# Patient Record
Sex: FEMALE | Race: WHITE | Employment: FULL TIME | URBAN - METROPOLITAN AREA
[De-identification: names, ages, dates, MRNs, and addresses within clinical notes are randomized per-mention and may not be internally consistent; named-entity substitution may affect disease eponyms.]

---

## 2017-02-15 ENCOUNTER — OFFICE VISIT (OUTPATIENT)
Dept: FAMILY MEDICINE CLINIC | Facility: CLINIC | Age: 47
End: 2017-02-15

## 2017-02-15 VITALS
HEIGHT: 63 IN | OXYGEN SATURATION: 96 % | WEIGHT: 163.5 LBS | RESPIRATION RATE: 16 BRPM | SYSTOLIC BLOOD PRESSURE: 116 MMHG | BODY MASS INDEX: 28.97 KG/M2 | HEART RATE: 69 BPM | DIASTOLIC BLOOD PRESSURE: 80 MMHG | TEMPERATURE: 98 F

## 2017-02-15 DIAGNOSIS — Z00.00 ROUTINE HISTORY AND PHYSICAL EXAMINATION OF ADULT: Primary | ICD-10-CM

## 2017-02-15 DIAGNOSIS — Z13.0 SCREENING, ANEMIA, DEFICIENCY, IRON: ICD-10-CM

## 2017-02-15 DIAGNOSIS — R92.2 DENSE BREAST TISSUE ON MAMMOGRAM: ICD-10-CM

## 2017-02-15 DIAGNOSIS — Z12.4 CERVICAL CANCER SCREENING: ICD-10-CM

## 2017-02-15 DIAGNOSIS — Z13.29 THYROID DISORDER SCREEN: ICD-10-CM

## 2017-02-15 DIAGNOSIS — E55.9 VITAMIN D DEFICIENCY: ICD-10-CM

## 2017-02-15 DIAGNOSIS — E04.1 THYROID NODULE: ICD-10-CM

## 2017-02-15 DIAGNOSIS — Z13.21 ENCOUNTER FOR VITAMIN DEFICIENCY SCREENING: ICD-10-CM

## 2017-02-15 DIAGNOSIS — Z13.1 DIABETES MELLITUS SCREENING: ICD-10-CM

## 2017-02-15 DIAGNOSIS — Z13.220 LIPID SCREENING: ICD-10-CM

## 2017-02-15 PROBLEM — R92.30 DENSE BREAST TISSUE ON MAMMOGRAM: Status: ACTIVE | Noted: 2017-02-15

## 2017-02-15 LAB
BASOPHILS # BLD AUTO: 0.05 X10(3) UL (ref 0–0.1)
BASOPHILS NFR BLD AUTO: 0.6 %
EOSINOPHIL # BLD AUTO: 0.09 X10(3) UL (ref 0–0.3)
EOSINOPHIL NFR BLD AUTO: 1.1 %
ERYTHROCYTE [DISTWIDTH] IN BLOOD BY AUTOMATED COUNT: 13.3 % (ref 11.5–16)
HCT VFR BLD AUTO: 40.7 % (ref 34–50)
HGB BLD-MCNC: 13.1 G/DL (ref 12–16)
IMMATURE GRANULOCYTE COUNT: 0.01 X10(3) UL (ref 0–1)
IMMATURE GRANULOCYTE RATIO %: 0.1 %
LYMPHOCYTES # BLD AUTO: 2.45 X10(3) UL (ref 0.9–4)
LYMPHOCYTES NFR BLD AUTO: 29.6 %
MCH RBC QN AUTO: 28.9 PG (ref 27–33.2)
MCHC RBC AUTO-ENTMCNC: 32.2 G/DL (ref 31–37)
MCV RBC AUTO: 89.6 FL (ref 81–100)
MONOCYTES # BLD AUTO: 0.57 X10(3) UL (ref 0.1–0.6)
MONOCYTES NFR BLD AUTO: 6.9 %
NEUTROPHIL ABS PRELIM: 5.12 X10 (3) UL (ref 1.3–6.7)
NEUTROPHILS # BLD AUTO: 5.12 X10(3) UL (ref 1.3–6.7)
NEUTROPHILS NFR BLD AUTO: 61.7 %
PLATELET # BLD AUTO: 318 10(3)UL (ref 150–450)
RBC # BLD AUTO: 4.54 X10(6)UL (ref 3.8–5.1)
RED CELL DISTRIBUTION WIDTH-SD: 43.7 FL (ref 35.1–46.3)
WBC # BLD AUTO: 8.3 X10(3) UL (ref 4–13)

## 2017-02-15 PROCEDURE — 83036 HEMOGLOBIN GLYCOSYLATED A1C: CPT | Performed by: FAMILY MEDICINE

## 2017-02-15 PROCEDURE — 84439 ASSAY OF FREE THYROXINE: CPT | Performed by: FAMILY MEDICINE

## 2017-02-15 PROCEDURE — 80050 GENERAL HEALTH PANEL: CPT | Performed by: FAMILY MEDICINE

## 2017-02-15 PROCEDURE — 88175 CYTOPATH C/V AUTO FLUID REDO: CPT | Performed by: FAMILY MEDICINE

## 2017-02-15 PROCEDURE — 80061 LIPID PANEL: CPT | Performed by: FAMILY MEDICINE

## 2017-02-15 PROCEDURE — 99396 PREV VISIT EST AGE 40-64: CPT | Performed by: FAMILY MEDICINE

## 2017-02-15 PROCEDURE — 80053 COMPREHEN METABOLIC PANEL: CPT | Performed by: FAMILY MEDICINE

## 2017-02-15 PROCEDURE — 84443 ASSAY THYROID STIM HORMONE: CPT | Performed by: FAMILY MEDICINE

## 2017-02-15 PROCEDURE — 82306 VITAMIN D 25 HYDROXY: CPT | Performed by: FAMILY MEDICINE

## 2017-02-15 PROCEDURE — 85025 COMPLETE CBC W/AUTO DIFF WBC: CPT | Performed by: FAMILY MEDICINE

## 2017-02-15 PROCEDURE — 87624 HPV HI-RISK TYP POOLED RSLT: CPT | Performed by: FAMILY MEDICINE

## 2017-02-15 PROCEDURE — 36415 COLL VENOUS BLD VENIPUNCTURE: CPT | Performed by: FAMILY MEDICINE

## 2017-02-15 RX ORDER — ACETAMINOPHEN 160 MG
TABLET,DISINTEGRATING ORAL DAILY
COMMUNITY

## 2017-02-15 RX ORDER — MULTIVITAMIN WITH IRON
250 TABLET ORAL
COMMUNITY

## 2017-02-15 RX ORDER — MELATONIN 10 MG
1 CAPSULE ORAL NIGHTLY PRN
COMMUNITY

## 2017-02-15 NOTE — PROGRESS NOTES
HPI:   Maik Carranza is a 42year old female who presents for a complete physical exam.  Patient complains of nothing major, has been healtlhy. She continues to see Dr. Giovanna Díaz for anxiety and that is going well.   She was also sent for neurop Smokeless Status: Never Used                        Comment: Non-smoker    Alcohol Use: Yes                Comment: 2 per week    Occ: dental hygenist. : yes. Children: yes.    Expretty healthyercise: 6days/week  Diet: pretty healthy     REVIEW OF SY are intact,motor and sensory are grossly intact    ASSESSMENT AND PLAN:   Generally well appearing female with normal exam and healthy lifestyle, keep it up  Check routine fasting labs as ordered  PAP sent with HR HPV, if neg repeat in 3 years  STD testing

## 2017-02-16 LAB
25-HYDROXYVITAMIN D (TOTAL): 43.6 NG/ML (ref 30–100)
ALBUMIN SERPL-MCNC: 4 G/DL (ref 3.5–4.8)
ALP LIVER SERPL-CCNC: 85 U/L (ref 37–98)
ALT SERPL-CCNC: 31 U/L (ref 14–54)
AST SERPL-CCNC: 17 U/L (ref 15–41)
BILIRUB SERPL-MCNC: 0.4 MG/DL (ref 0.1–2)
BUN BLD-MCNC: 11 MG/DL (ref 8–20)
CALCIUM BLD-MCNC: 8.4 MG/DL (ref 8.3–10.3)
CHLORIDE: 103 MMOL/L (ref 101–111)
CHOLEST SMN-MCNC: 214 MG/DL (ref ?–200)
CO2: 31 MMOL/L (ref 22–32)
CREAT BLD-MCNC: 1.03 MG/DL (ref 0.55–1.02)
EST. AVERAGE GLUCOSE BLD GHB EST-MCNC: 114 MG/DL (ref 68–126)
FREE T4: 1 NG/DL (ref 0.9–1.8)
GLUCOSE BLD-MCNC: 78 MG/DL (ref 70–99)
HBA1C MFR BLD HPLC: 5.6 % (ref ?–5.7)
HDLC SERPL-MCNC: 70 MG/DL (ref 45–?)
HDLC SERPL: 3.06 {RATIO} (ref ?–4.44)
LDLC SERPL CALC-MCNC: 123 MG/DL (ref ?–130)
M PROTEIN MFR SERPL ELPH: 7.2 G/DL (ref 6.1–8.3)
NONHDLC SERPL-MCNC: 144 MG/DL (ref ?–130)
POTASSIUM SERPL-SCNC: 3.4 MMOL/L (ref 3.6–5.1)
SODIUM SERPL-SCNC: 142 MMOL/L (ref 136–144)
TRIGLYCERIDES: 106 MG/DL (ref ?–150)
TSI SER-ACNC: 1.27 MIU/ML (ref 0.35–5.5)
VLDL: 21 MG/DL (ref 5–40)

## 2017-02-17 LAB — HPV I/H RISK 1 DNA SPEC QL NAA+PROBE: NEGATIVE

## 2017-02-20 LAB
LAST PAP RESULT: NORMAL
PAP HISTORY (OTHER THAN LAST PAP): NORMAL

## 2017-08-10 ENCOUNTER — TELEPHONE (OUTPATIENT)
Dept: FAMILY MEDICINE CLINIC | Facility: CLINIC | Age: 47
End: 2017-08-10

## 2017-08-10 DIAGNOSIS — E04.1 THYROID NODULE: Primary | ICD-10-CM

## 2017-08-10 DIAGNOSIS — R92.2 DENSE BREAST TISSUE ON MAMMOGRAM: ICD-10-CM

## 2017-08-10 DIAGNOSIS — Z12.31 ENCOUNTER FOR SCREENING MAMMOGRAM FOR BREAST CANCER: ICD-10-CM

## 2017-08-10 NOTE — TELEPHONE ENCOUNTER
PT STOPPED AND WOULD LIKE DR TO ORDER MAMMO AND THYROID ULTRASOUND. PT HAD THIS DONE LAST YEAR.     PLEASE CALL PT IF ANY QUESTIONS OR PROBLEMS [phone]      THANK YOU

## 2017-08-10 NOTE — TELEPHONE ENCOUNTER
Routing to Dr. Tutu Tamayo. Last mammo and thyroid US done on 9/1/16. Also has US G FNA thyroid nodule on 9/15/16 with benign findings. Please advise. Thank you.

## 2017-09-21 ENCOUNTER — HOSPITAL ENCOUNTER (OUTPATIENT)
Dept: ULTRASOUND IMAGING | Age: 47
Discharge: HOME | End: 2017-09-21
Attending: FAMILY MEDICINE
Payer: COMMERCIAL

## 2017-09-21 ENCOUNTER — HOSPITAL ENCOUNTER (OUTPATIENT)
Dept: MAMMOGRAPHY | Age: 47
Discharge: HOME | End: 2017-09-21
Attending: FAMILY MEDICINE
Payer: COMMERCIAL

## 2017-09-21 DIAGNOSIS — Z12.31 ENCOUNTER FOR SCREENING MAMMOGRAM FOR BREAST CANCER: ICD-10-CM

## 2017-09-21 DIAGNOSIS — E04.1 THYROID NODULE: ICD-10-CM

## 2017-09-21 DIAGNOSIS — R92.2 DENSE BREAST TISSUE ON MAMMOGRAM: ICD-10-CM

## 2017-09-21 PROCEDURE — 77063 BREAST TOMOSYNTHESIS BI: CPT | Performed by: FAMILY MEDICINE

## 2017-09-21 PROCEDURE — 77067 SCR MAMMO BI INCL CAD: CPT | Performed by: FAMILY MEDICINE

## 2017-09-21 PROCEDURE — 76536 US EXAM OF HEAD AND NECK: CPT | Performed by: FAMILY MEDICINE

## 2018-04-23 ENCOUNTER — PATIENT MESSAGE (OUTPATIENT)
Dept: FAMILY MEDICINE CLINIC | Facility: CLINIC | Age: 48
End: 2018-04-23

## 2018-04-23 DIAGNOSIS — Z13.0 SCREENING FOR DEFICIENCY ANEMIA: ICD-10-CM

## 2018-04-23 DIAGNOSIS — Z13.220 LIPID SCREENING: ICD-10-CM

## 2018-04-23 DIAGNOSIS — Z00.00 ROUTINE GENERAL MEDICAL EXAMINATION AT A HEALTH CARE FACILITY: Primary | ICD-10-CM

## 2018-04-23 DIAGNOSIS — E55.9 VITAMIN D DEFICIENCY: ICD-10-CM

## 2018-04-23 DIAGNOSIS — E04.1 THYROID NODULE: ICD-10-CM

## 2018-04-23 DIAGNOSIS — Z13.1 DIABETES MELLITUS SCREENING: ICD-10-CM

## 2018-04-24 NOTE — TELEPHONE ENCOUNTER
From: Ernie Welch  To: Adalberto Gilliam MD  Sent: 4/23/2018 9:17 PM CDT  Subject: Prescription Question    Hi there, hope you're doing great.  I saw my psychiatrist today for routine visit (Dr Piedad Talbert) & mentioned to her that I will notice mys

## 2018-05-29 ENCOUNTER — TELEPHONE (OUTPATIENT)
Dept: FAMILY MEDICINE CLINIC | Facility: CLINIC | Age: 48
End: 2018-05-29

## 2018-06-02 ENCOUNTER — OFFICE VISIT (OUTPATIENT)
Dept: FAMILY MEDICINE CLINIC | Facility: CLINIC | Age: 48
End: 2018-06-02

## 2018-06-02 VITALS
TEMPERATURE: 98 F | OXYGEN SATURATION: 99 % | DIASTOLIC BLOOD PRESSURE: 78 MMHG | BODY MASS INDEX: 26.61 KG/M2 | SYSTOLIC BLOOD PRESSURE: 118 MMHG | RESPIRATION RATE: 16 BRPM | WEIGHT: 150.19 LBS | HEART RATE: 65 BPM | HEIGHT: 63 IN

## 2018-06-02 DIAGNOSIS — H60.391 OTHER INFECTIVE ACUTE OTITIS EXTERNA OF RIGHT EAR: Primary | ICD-10-CM

## 2018-06-02 PROCEDURE — 99214 OFFICE O/P EST MOD 30 MIN: CPT | Performed by: FAMILY MEDICINE

## 2018-06-02 RX ORDER — CIPROFLOXACIN AND DEXAMETHASONE 3; 1 MG/ML; MG/ML
4 SUSPENSION/ DROPS AURICULAR (OTIC) 2 TIMES DAILY
Qty: 1 BOTTLE | Refills: 0 | Status: SHIPPED | OUTPATIENT
Start: 2018-06-02 | End: 2018-06-09

## 2018-06-02 NOTE — PROGRESS NOTES
Vladimir Alexandre is a 57year old female. Patient presents with:  Ear Problem: per pt. Ear tender to touch, painful, inflammed, for 2 days      HPI:   Has had bad ears her whole life. Had permanent tubes placed at age 5.    Now pain in her right ear, jaw oz  02/15/17 : 163 lb 8 oz  10/20/15 : 159 lb  03/13/15 : 156 lb 8 oz  03/11/15 : 155 lb  03/21/13 : 152 lb 6 oz      REVIEW OF SYSTEMS:   GENERAL HEALTH: feels well no complaints  SKIN: denies any unusual skin lesions or rashes  RESPIRATORY: denies shortn

## 2018-06-05 ENCOUNTER — TELEPHONE (OUTPATIENT)
Dept: FAMILY MEDICINE CLINIC | Facility: CLINIC | Age: 48
End: 2018-06-05

## 2018-06-05 RX ORDER — CEFDINIR 300 MG/1
300 CAPSULE ORAL 2 TIMES DAILY
Qty: 20 CAPSULE | Refills: 0 | Status: SHIPPED | OUTPATIENT
Start: 2018-06-05 | End: 2018-06-15

## 2018-06-05 NOTE — TELEPHONE ENCOUNTER
Spoke with the pt and I asked her about the reaction to the ampicillin and she states that it was diarrhea    I advised that we called in cefdinir for her- she v/u  She states that she has an appt with her ENT on Thursday  Advised to call if something come

## 2018-06-05 NOTE — TELEPHONE ENCOUNTER
I'd like to put her on an oral abx. I would like her to take cefdinir. Has she tolerated that before? There is a very small risk of cross reaction when you have a penicillin allergy. What was her rxn to ampicillin.  If it was anaphylaxis, I would send a zpa

## 2018-06-05 NOTE — TELEPHONE ENCOUNTER
PT CALLED AND ADV THAT SHE IS NOT FEELING ANY BETTER AND WOULD LIKE TO SEE IF DR CAN CALL IN SOMETHING.   PT WAS SEEN ON Saturday W/DR AMES AND WAS GIVEN DROPS FOR EAR INFECTION. 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

## 2018-06-14 ENCOUNTER — MED REC SCAN ONLY (OUTPATIENT)
Dept: FAMILY MEDICINE CLINIC | Facility: CLINIC | Age: 48
End: 2018-06-14

## 2018-06-16 ENCOUNTER — NURSE ONLY (OUTPATIENT)
Dept: FAMILY MEDICINE CLINIC | Facility: CLINIC | Age: 48
End: 2018-06-16

## 2018-06-16 DIAGNOSIS — Z00.00 ROUTINE GENERAL MEDICAL EXAMINATION AT A HEALTH CARE FACILITY: ICD-10-CM

## 2018-06-16 DIAGNOSIS — Z13.220 LIPID SCREENING: ICD-10-CM

## 2018-06-16 DIAGNOSIS — Z13.1 DIABETES MELLITUS SCREENING: ICD-10-CM

## 2018-06-16 DIAGNOSIS — E55.9 VITAMIN D DEFICIENCY: ICD-10-CM

## 2018-06-16 DIAGNOSIS — E04.1 THYROID NODULE: ICD-10-CM

## 2018-06-16 DIAGNOSIS — Z13.0 SCREENING FOR DEFICIENCY ANEMIA: ICD-10-CM

## 2018-06-16 PROCEDURE — 82306 VITAMIN D 25 HYDROXY: CPT | Performed by: FAMILY MEDICINE

## 2018-06-16 PROCEDURE — 36415 COLL VENOUS BLD VENIPUNCTURE: CPT | Performed by: FAMILY MEDICINE

## 2018-06-16 PROCEDURE — 80050 GENERAL HEALTH PANEL: CPT | Performed by: FAMILY MEDICINE

## 2018-06-16 PROCEDURE — 80061 LIPID PANEL: CPT | Performed by: FAMILY MEDICINE

## 2018-06-16 NOTE — PROGRESS NOTES
1 gold, 1 mint and 1 lavender tube collected from L AC using straight needle and 1 attempt    Pt tolerated and was sent home in stable condition

## 2018-06-21 ENCOUNTER — OFFICE VISIT (OUTPATIENT)
Dept: FAMILY MEDICINE CLINIC | Facility: CLINIC | Age: 48
End: 2018-06-21

## 2018-06-21 VITALS
TEMPERATURE: 98 F | RESPIRATION RATE: 16 BRPM | HEART RATE: 64 BPM | SYSTOLIC BLOOD PRESSURE: 116 MMHG | HEIGHT: 63 IN | WEIGHT: 149 LBS | DIASTOLIC BLOOD PRESSURE: 70 MMHG | BODY MASS INDEX: 26.4 KG/M2

## 2018-06-21 DIAGNOSIS — Z12.31 ENCOUNTER FOR SCREENING MAMMOGRAM FOR BREAST CANCER: ICD-10-CM

## 2018-06-21 DIAGNOSIS — R22.0 CHEEK SWELLING: ICD-10-CM

## 2018-06-21 DIAGNOSIS — D50.9 MICROCYTIC ANEMIA: ICD-10-CM

## 2018-06-21 DIAGNOSIS — Z00.00 ROUTINE HISTORY AND PHYSICAL EXAMINATION OF ADULT: Primary | ICD-10-CM

## 2018-06-21 DIAGNOSIS — N94.3 PMS (PREMENSTRUAL SYNDROME): ICD-10-CM

## 2018-06-21 DIAGNOSIS — E55.9 VITAMIN D DEFICIENCY: ICD-10-CM

## 2018-06-21 DIAGNOSIS — R92.2 DENSE BREASTS: ICD-10-CM

## 2018-06-21 DIAGNOSIS — Z12.39 SCREENING BREAST EXAMINATION: ICD-10-CM

## 2018-06-21 DIAGNOSIS — R79.89 ELEVATED SERUM CREATININE: ICD-10-CM

## 2018-06-21 DIAGNOSIS — E04.1 THYROID NODULE: ICD-10-CM

## 2018-06-21 PROCEDURE — 99396 PREV VISIT EST AGE 40-64: CPT | Performed by: FAMILY MEDICINE

## 2018-06-21 RX ORDER — NORETHINDRONE ACETATE AND ETHINYL ESTRADIOL 1MG-20(21)
1 KIT ORAL DAILY
Qty: 28 TABLET | Refills: 11 | Status: SHIPPED | OUTPATIENT
Start: 2018-06-21 | End: 2019-10-02

## 2018-06-21 NOTE — PROGRESS NOTES
HPI:   Maik Carranza is a 57year old female who presents for a complete physical exam.  Patient complains of periods being regular but notices pattern of 7-10 days before period feeling uneven, emotional, irritable, and every time she looks at her tr 150 MG Oral Tablet 24 Hr Take 1 tablet by mouth daily. Disp:  Rfl: 0   BusPIRone HCl (BUSPAR) 15 MG Oral Tab Take 15 mg by mouth 2 (two) times daily.    Disp:  Rfl: 0      Past Medical History:   Diagnosis Date   • Family history of malignant neoplasm of br months  MUSCULOSKELETAL: denies back pain, no joint pains or swelling  NEURO: denies headaches, no syncope or near syncope  PSYCHE: well controlled depression or anxiety apart from pre-menstrual time  HEMATOLOGIC: no bruising or noted lymph nodes  ENDOCRIN now (go back to 10,000 oct-pril)   Mild Cr elevation likely d/t dehdyration, can recheck labs in 1 year with px  Recheck thyroid u/s in 2019  PAP UTD  STD testing not indicated  Immunizations UTD  Mammogram gave order for the fall  Colonoscopy not indicate

## 2018-09-26 ENCOUNTER — TELEPHONE (OUTPATIENT)
Dept: FAMILY MEDICINE CLINIC | Facility: CLINIC | Age: 48
End: 2018-09-26

## 2018-09-26 ENCOUNTER — HOSPITAL ENCOUNTER (OUTPATIENT)
Dept: MAMMOGRAPHY | Age: 48
Discharge: HOME | End: 2018-09-26
Attending: FAMILY MEDICINE
Payer: COMMERCIAL

## 2018-09-26 DIAGNOSIS — Z12.31 ENCOUNTER FOR SCREENING MAMMOGRAM FOR BREAST CANCER: ICD-10-CM

## 2018-09-26 DIAGNOSIS — R92.2 DENSE BREASTS: ICD-10-CM

## 2018-09-26 PROCEDURE — 77063 BREAST TOMOSYNTHESIS BI: CPT | Performed by: FAMILY MEDICINE

## 2018-09-26 PROCEDURE — 77067 SCR MAMMO BI INCL CAD: CPT | Performed by: FAMILY MEDICINE

## 2018-09-26 NOTE — TELEPHONE ENCOUNTER
PT CALLED AND ADV THAT SHE THINKS THAT SHE HAS A TORN CALF MUSCLE.     WOULD LIKE TO KNOW IF SHE NEEDS TO BE SEEN BY DR Antione Roque OR CAN SHE GO TO ORTHOPEDIC DR?    PLEASE CALL AND ADV

## 2018-09-26 NOTE — TELEPHONE ENCOUNTER
Colette, agree likely muscle and/or tendon injury, ice, wrap and see ortho.   Dr. Marta Lange [phone]

## 2018-09-26 NOTE — TELEPHONE ENCOUNTER
A few weeks ago reached up cleaning a mirror. Something felt like it popped on right achilles. Calf muscle felt tight. Made modifications to workout so that she wouldn't aggravate it.   Was at the gym today, went to push up and felt immense pain right i

## 2018-09-26 NOTE — TELEPHONE ENCOUNTER
Patient advised of below. Also advised to confirm with insurance that Dr. Du Martinez would be covered. Agrees to plan. No further questions at this time.

## 2018-10-04 ENCOUNTER — HOSPITAL ENCOUNTER (OUTPATIENT)
Dept: MAMMOGRAPHY | Age: 48
Discharge: HOME | End: 2018-10-04
Attending: FAMILY MEDICINE
Payer: COMMERCIAL

## 2018-10-04 ENCOUNTER — HOSPITAL ENCOUNTER (OUTPATIENT)
Dept: ULTRASOUND IMAGING | Age: 48
Discharge: HOME | End: 2018-10-04
Attending: FAMILY MEDICINE
Payer: COMMERCIAL

## 2018-10-04 DIAGNOSIS — R92.2 INCONCLUSIVE MAMMOGRAM: ICD-10-CM

## 2018-10-04 PROCEDURE — 76642 ULTRASOUND BREAST LIMITED: CPT | Performed by: FAMILY MEDICINE

## 2018-10-04 PROCEDURE — 77066 DX MAMMO INCL CAD BI: CPT | Performed by: FAMILY MEDICINE

## 2018-10-04 PROCEDURE — 77062 BREAST TOMOSYNTHESIS BI: CPT | Performed by: FAMILY MEDICINE

## 2019-09-23 ENCOUNTER — TELEPHONE (OUTPATIENT)
Dept: FAMILY MEDICINE CLINIC | Facility: CLINIC | Age: 49
End: 2019-09-23

## 2019-09-23 ENCOUNTER — PATIENT MESSAGE (OUTPATIENT)
Dept: FAMILY MEDICINE CLINIC | Facility: CLINIC | Age: 49
End: 2019-09-23

## 2019-09-23 DIAGNOSIS — E04.1 THYROID NODULE: ICD-10-CM

## 2019-09-23 DIAGNOSIS — Z00.00 ROUTINE GENERAL MEDICAL EXAMINATION AT A HEALTH CARE FACILITY: ICD-10-CM

## 2019-09-23 DIAGNOSIS — Z12.31 SCREENING MAMMOGRAM, ENCOUNTER FOR: Primary | ICD-10-CM

## 2019-09-23 NOTE — TELEPHONE ENCOUNTER
She is due for the ultrasound,  Last mammo was 10/4/18    Future Appointments   Date Time Provider [place]   10/2/2019  4:00 PM Martha Cortez MD [facility] EMG Sonia Short

## 2019-09-23 NOTE — TELEPHONE ENCOUNTER
From: Arthur Baeza  To: Maile Owens MD  Sent: 9/23/2019 5:59 PM CDT  Subject: Other    Hi there. Just wondering if Dr Foreman Deaana can order my blood work, so that I can have it done & she will have the results at my physical appt on October 3rd.    Also-

## 2019-09-23 NOTE — TELEPHONE ENCOUNTER
Patient has an appointment next Wednesday with Dr Ameya Sage for her Annual Px. Wants to get blood work done before so that they can go over results at her appt next week. Will Dr Ameya Sage put orders in for her yearly labs, her annual mammogram & her Thyroid US?

## 2019-09-24 NOTE — TELEPHONE ENCOUNTER
Called the pt and advised of the orders and we scheduled her NV  Future Appointments   Date Time Provider [place]   9/26/2019  8:45 AM  [facility] EMG Aldair Quiroz   10/2/2019  4:00 PM Michael Gifford MD [facility] EMG Aldair Quiroz

## 2019-09-26 ENCOUNTER — NURSE ONLY (OUTPATIENT)
Dept: FAMILY MEDICINE CLINIC | Facility: CLINIC | Age: 49
End: 2019-09-26
Payer: COMMERCIAL

## 2019-09-26 DIAGNOSIS — Z00.00 ROUTINE GENERAL MEDICAL EXAMINATION AT A HEALTH CARE FACILITY: ICD-10-CM

## 2019-09-26 DIAGNOSIS — R73.9 ELEVATED BLOOD SUGAR: Primary | ICD-10-CM

## 2019-09-26 DIAGNOSIS — E04.1 THYROID NODULE: ICD-10-CM

## 2019-09-26 LAB
ALBUMIN SERPL-MCNC: 4.2 G/DL (ref 3.4–5)
ALBUMIN/GLOB SERPL: 1.4 {RATIO} (ref 1–2)
ALP LIVER SERPL-CCNC: 64 U/L (ref 39–100)
ALT SERPL-CCNC: 19 U/L (ref 13–56)
ANION GAP SERPL CALC-SCNC: 2 MMOL/L (ref 0–18)
AST SERPL-CCNC: 11 U/L (ref 15–37)
BASOPHILS # BLD AUTO: 0.05 X10(3) UL (ref 0–0.2)
BASOPHILS NFR BLD AUTO: 0.7 %
BILIRUB SERPL-MCNC: 0.4 MG/DL (ref 0.1–2)
BUN BLD-MCNC: 12 MG/DL (ref 7–18)
BUN/CREAT SERPL: 11.1 (ref 10–20)
CALCIUM BLD-MCNC: 8.9 MG/DL (ref 8.5–10.1)
CHLORIDE SERPL-SCNC: 107 MMOL/L (ref 98–112)
CHOLEST SMN-MCNC: 249 MG/DL (ref ?–200)
CO2 SERPL-SCNC: 30 MMOL/L (ref 21–32)
CREAT BLD-MCNC: 1.08 MG/DL (ref 0.55–1.02)
DEPRECATED RDW RBC AUTO: 42.3 FL (ref 35.1–46.3)
EOSINOPHIL # BLD AUTO: 0.07 X10(3) UL (ref 0–0.7)
EOSINOPHIL NFR BLD AUTO: 1 %
ERYTHROCYTE [DISTWIDTH] IN BLOOD BY AUTOMATED COUNT: 13.2 % (ref 11–15)
GLOBULIN PLAS-MCNC: 2.9 G/DL (ref 2.8–4.4)
GLUCOSE BLD-MCNC: 108 MG/DL (ref 70–99)
HCT VFR BLD AUTO: 41.3 % (ref 35–48)
HDLC SERPL-MCNC: 55 MG/DL (ref 40–59)
HGB BLD-MCNC: 13.3 G/DL (ref 12–16)
IMM GRANULOCYTES # BLD AUTO: 0.02 X10(3) UL (ref 0–1)
IMM GRANULOCYTES NFR BLD: 0.3 %
LDLC SERPL CALC-MCNC: 174 MG/DL (ref ?–100)
LYMPHOCYTES # BLD AUTO: 1.73 X10(3) UL (ref 1–4)
LYMPHOCYTES NFR BLD AUTO: 24.3 %
M PROTEIN MFR SERPL ELPH: 7.1 G/DL (ref 6.4–8.2)
MCH RBC QN AUTO: 28.1 PG (ref 26–34)
MCHC RBC AUTO-ENTMCNC: 32.2 G/DL (ref 31–37)
MCV RBC AUTO: 87.3 FL (ref 80–100)
MONOCYTES # BLD AUTO: 0.46 X10(3) UL (ref 0.1–1)
MONOCYTES NFR BLD AUTO: 6.5 %
NEUTROPHILS # BLD AUTO: 4.79 X10 (3) UL (ref 1.5–7.7)
NEUTROPHILS # BLD AUTO: 4.79 X10(3) UL (ref 1.5–7.7)
NEUTROPHILS NFR BLD AUTO: 67.2 %
NONHDLC SERPL-MCNC: 194 MG/DL (ref ?–130)
OSMOLALITY SERPL CALC.SUM OF ELEC: 288 MOSM/KG (ref 275–295)
PLATELET # BLD AUTO: 252 10(3)UL (ref 150–450)
POTASSIUM SERPL-SCNC: 4.5 MMOL/L (ref 3.5–5.1)
RBC # BLD AUTO: 4.73 X10(6)UL (ref 3.8–5.3)
SODIUM SERPL-SCNC: 139 MMOL/L (ref 136–145)
TRIGL SERPL-MCNC: 98 MG/DL (ref 30–149)
TSI SER-ACNC: 1.52 MIU/ML (ref 0.36–3.74)
VIT D+METAB SERPL-MCNC: 48.9 NG/ML (ref 30–100)
VLDLC SERPL CALC-MCNC: 20 MG/DL (ref 0–30)
WBC # BLD AUTO: 7.1 X10(3) UL (ref 4–11)

## 2019-09-26 PROCEDURE — 83036 HEMOGLOBIN GLYCOSYLATED A1C: CPT | Performed by: FAMILY MEDICINE

## 2019-09-26 PROCEDURE — 80050 GENERAL HEALTH PANEL: CPT | Performed by: FAMILY MEDICINE

## 2019-09-26 PROCEDURE — 80061 LIPID PANEL: CPT | Performed by: FAMILY MEDICINE

## 2019-09-26 PROCEDURE — 82306 VITAMIN D 25 HYDROXY: CPT | Performed by: FAMILY MEDICINE

## 2019-09-26 PROCEDURE — 36415 COLL VENOUS BLD VENIPUNCTURE: CPT | Performed by: FAMILY MEDICINE

## 2019-09-26 NOTE — PROGRESS NOTES
Pt was in office for NV for labs per [facility]    1 gold, 1 mint and 1 lavender tube collected from L AC using straight needle and 1 attempt    Pt tolerated and was sent home in stable condition

## 2019-09-29 LAB
EST. AVERAGE GLUCOSE BLD GHB EST-MCNC: 114 MG/DL (ref 68–126)
HBA1C MFR BLD HPLC: 5.6 % (ref ?–5.7)

## 2019-10-02 ENCOUNTER — HOSPITAL ENCOUNTER (OUTPATIENT)
Dept: MAMMOGRAPHY | Age: 49
Discharge: HOME | End: 2019-10-02
Attending: FAMILY MEDICINE
Payer: COMMERCIAL

## 2019-10-02 ENCOUNTER — OFFICE VISIT (OUTPATIENT)
Dept: FAMILY MEDICINE CLINIC | Facility: CLINIC | Age: 49
End: 2019-10-02
Payer: COMMERCIAL

## 2019-10-02 ENCOUNTER — HOSPITAL ENCOUNTER (OUTPATIENT)
Dept: ULTRASOUND IMAGING | Age: 49
Discharge: HOME | End: 2019-10-02
Attending: FAMILY MEDICINE
Payer: COMMERCIAL

## 2019-10-02 VITALS
SYSTOLIC BLOOD PRESSURE: 136 MMHG | DIASTOLIC BLOOD PRESSURE: 88 MMHG | HEIGHT: 63 IN | BODY MASS INDEX: 25.87 KG/M2 | TEMPERATURE: 100 F | RESPIRATION RATE: 16 BRPM | HEART RATE: 72 BPM | WEIGHT: 146 LBS

## 2019-10-02 DIAGNOSIS — Z01.118 ABNORMAL EXAM OF RIGHT EAR: ICD-10-CM

## 2019-10-02 DIAGNOSIS — F41.9 ANXIETY: ICD-10-CM

## 2019-10-02 DIAGNOSIS — Z12.31 SCREENING MAMMOGRAM, ENCOUNTER FOR: ICD-10-CM

## 2019-10-02 DIAGNOSIS — R42 DIZZINESS: ICD-10-CM

## 2019-10-02 DIAGNOSIS — E04.1 THYROID NODULE: ICD-10-CM

## 2019-10-02 DIAGNOSIS — Z00.00 ROUTINE HISTORY AND PHYSICAL EXAMINATION OF ADULT: Primary | ICD-10-CM

## 2019-10-02 PROCEDURE — 77067 SCR MAMMO BI INCL CAD: CPT | Performed by: FAMILY MEDICINE

## 2019-10-02 PROCEDURE — 77063 BREAST TOMOSYNTHESIS BI: CPT | Performed by: FAMILY MEDICINE

## 2019-10-02 PROCEDURE — 99396 PREV VISIT EST AGE 40-64: CPT | Performed by: FAMILY MEDICINE

## 2019-10-02 PROCEDURE — 76536 US EXAM OF HEAD AND NECK: CPT | Performed by: FAMILY MEDICINE

## 2019-10-02 RX ORDER — BUPROPION HYDROCHLORIDE 300 MG/1
TABLET ORAL
Refills: 0 | COMMUNITY
Start: 2019-10-01

## 2019-10-02 RX ORDER — OMEGA-3-ACID ETHYL ESTERS 1 G/1
1 CAPSULE, LIQUID FILLED ORAL DAILY
COMMUNITY

## 2019-10-02 RX ORDER — ALPRAZOLAM 0.25 MG/1
TABLET ORAL
Refills: 1 | COMMUNITY
Start: 2019-09-29

## 2019-10-02 NOTE — PROGRESS NOTES
HPI:   Arthur Baeza is a 54year old female who presents for a complete physical exam.      Patient complains of feeling very stressed and anxious lately, has gong through a lot with an aunt who moved in with her, has al ot of health issues, has had ALT (U/L)   Date Value   09/26/2019 19   06/16/2018 25   02/15/2017 31   08/20/2012 20              Current Outpatient Medications:  buPROPion HCl ER, XL, 300 MG Oral Tablet 24 Hr TK 1 T PO QD Disp:  Rfl: 0   Omega-3-acid Ethyl Esters 1 g Oral Cap Take cough  CARDIOVASCULAR: denies chest pain on exertion,, no edema; feels like heart pounding when she's really anxiou  GI: denies abdominal pain,denies heartburn, no nausea, no changes in BMs, no blood in stool  : denies dysuria, vaginal discharge or itchi shot today    2. Thyroid nodule  Had u/s today, will get results to her when in    3. Dizziness  ? Related to chronic sleep deprivation vs abnormal ear exam vs anxiety vs ? See below    4.  Anxiety  -cont f/u with therapist and psychiatrist; she'll call the

## 2019-10-15 ENCOUNTER — MED REC SCAN ONLY (OUTPATIENT)
Dept: FAMILY MEDICINE CLINIC | Facility: CLINIC | Age: 49
End: 2019-10-15

## 2019-11-11 ENCOUNTER — PATIENT MESSAGE (OUTPATIENT)
Dept: FAMILY MEDICINE CLINIC | Facility: CLINIC | Age: 49
End: 2019-11-11

## 2019-11-11 NOTE — TELEPHONE ENCOUNTER
From: Vladimir Alexandre  To: Terrance Albert MD  Sent: 11/11/2019 2:13 PM CST  Subject: Non-Urgent Medical Question    Teresita Henry. Something is wrong. .. something is weird.  I should have mentioned it at my physical- but I think I was more concerned with my lab resu

## 2019-11-13 ENCOUNTER — HOSPITAL ENCOUNTER (OUTPATIENT)
Dept: GENERAL RADIOLOGY | Age: 49
Discharge: HOME | End: 2019-11-13
Attending: FAMILY MEDICINE
Payer: COMMERCIAL

## 2019-11-13 ENCOUNTER — OFFICE VISIT (OUTPATIENT)
Dept: FAMILY MEDICINE CLINIC | Facility: CLINIC | Age: 49
End: 2019-11-13
Payer: COMMERCIAL

## 2019-11-13 VITALS
TEMPERATURE: 99 F | RESPIRATION RATE: 14 BRPM | HEART RATE: 66 BPM | SYSTOLIC BLOOD PRESSURE: 130 MMHG | BODY MASS INDEX: 25.9 KG/M2 | HEIGHT: 63 IN | WEIGHT: 146.19 LBS | DIASTOLIC BLOOD PRESSURE: 78 MMHG

## 2019-11-13 DIAGNOSIS — M21.921 ACQUIRED DEFORMITY OF RIGHT UPPER ARM: ICD-10-CM

## 2019-11-13 DIAGNOSIS — M79.601 BILATERAL ARM PAIN: ICD-10-CM

## 2019-11-13 DIAGNOSIS — M79.601 RIGHT ARM PAIN: ICD-10-CM

## 2019-11-13 DIAGNOSIS — M79.602 BILATERAL ARM PAIN: ICD-10-CM

## 2019-11-13 DIAGNOSIS — Z23 NEED FOR VACCINATION: Primary | ICD-10-CM

## 2019-11-13 PROCEDURE — 90471 IMMUNIZATION ADMIN: CPT | Performed by: FAMILY MEDICINE

## 2019-11-13 PROCEDURE — 90686 IIV4 VACC NO PRSV 0.5 ML IM: CPT | Performed by: FAMILY MEDICINE

## 2019-11-13 PROCEDURE — 99214 OFFICE O/P EST MOD 30 MIN: CPT | Performed by: FAMILY MEDICINE

## 2019-11-13 PROCEDURE — 73060 X-RAY EXAM OF HUMERUS: CPT | Performed by: FAMILY MEDICINE

## 2019-11-13 NOTE — PROGRESS NOTES
Teja Campos is a 54year old female. HPI:   Patient sent me this my chart message 2 days ago, now here to discuss:     \"My left & right arm both hurt.  The left one I'm going to attribute to \"tennis elbow,\" the right one- I have no idea.    It hu daily.       • Melatonin 10 MG Oral Cap Take 1 capsule by mouth nightly as needed. • magnesium 250 MG Oral Tab Take 250 mg by mouth. • BusPIRone HCl (BUSPAR) 15 MG Oral Tab Take 15 mg by mouth 2 (two) times daily.     0        HISTORY:  Past Medical effort  CARDIO: well perfused, radial pulse 2+ bi in resting and arm above head positions  MUSC: bi upper arms measure 29cm, bot measured 10cm proximal to olecranon  R medial antecubital fossa with a firm 2cm subcutaneous lumps, similar lumpy feeling exten

## 2020-02-19 ENCOUNTER — OFFICE VISIT (OUTPATIENT)
Dept: FAMILY MEDICINE CLINIC | Facility: CLINIC | Age: 50
End: 2020-02-19
Payer: COMMERCIAL

## 2020-02-19 VITALS
HEIGHT: 62.5 IN | TEMPERATURE: 99 F | HEART RATE: 72 BPM | BODY MASS INDEX: 26.85 KG/M2 | DIASTOLIC BLOOD PRESSURE: 80 MMHG | WEIGHT: 149.63 LBS | RESPIRATION RATE: 12 BRPM | SYSTOLIC BLOOD PRESSURE: 120 MMHG

## 2020-02-19 DIAGNOSIS — Z20.828 EXPOSURE TO INFLUENZA: ICD-10-CM

## 2020-02-19 DIAGNOSIS — Z12.4 CERVICAL CANCER SCREENING: Primary | ICD-10-CM

## 2020-02-19 DIAGNOSIS — M67.441 GANGLION CYST OF TENDON SHEATH OF RIGHT HAND: ICD-10-CM

## 2020-02-19 PROCEDURE — 87624 HPV HI-RISK TYP POOLED RSLT: CPT | Performed by: FAMILY MEDICINE

## 2020-02-19 PROCEDURE — 88175 CYTOPATH C/V AUTO FLUID REDO: CPT | Performed by: FAMILY MEDICINE

## 2020-02-19 PROCEDURE — 99214 OFFICE O/P EST MOD 30 MIN: CPT | Performed by: FAMILY MEDICINE

## 2020-02-19 RX ORDER — OSELTAMIVIR PHOSPHATE 75 MG/1
75 CAPSULE ORAL DAILY
Qty: 7 CAPSULE | Refills: 0 | Status: SHIPPED | OUTPATIENT
Start: 2020-02-19 | End: 2020-02-26

## 2020-02-20 LAB — HPV I/H RISK 1 DNA SPEC QL NAA+PROBE: NEGATIVE

## 2020-02-21 LAB
LAST PAP RESULT: NORMAL
PAP HISTORY (OTHER THAN LAST PAP): NORMAL

## 2020-02-24 NOTE — PROGRESS NOTES
HPI:   Julee Gamez is a 54year old female who presents for a PAP     Patient complains of being exposed to flu in the past 24 hours by a co-worker, she was confirmed type A. Is interested in prophylaxis with tamiflu, she currently has no symptoms. Past Medical History:   Diagnosis Date   • Family history of malignant neoplasm of breast 4/13/11   • Iron deficiency anemia secondary to blood loss (chronic) 5/26/10   • Nontoxic uninodular goiter 11/25/09    Thyroid nodule   • Perforation of tympanic m or suspicious mass, no nipple discharge  LUNGS: clear to auscultation  CARDIO: RRR without murmur  :introitus is normal, no pathologic appearing discharge,cervix is grossly normal,no adnexal masses or tenderness  MUSCULOSKELETAL:  FROM of UEs and LEs jah

## 2020-09-25 ENCOUNTER — PATIENT MESSAGE (OUTPATIENT)
Dept: FAMILY MEDICINE CLINIC | Facility: CLINIC | Age: 50
End: 2020-09-25

## 2020-09-25 DIAGNOSIS — E04.1 THYROID NODULE: ICD-10-CM

## 2020-09-25 DIAGNOSIS — Z12.31 SCREENING MAMMOGRAM, ENCOUNTER FOR: Primary | ICD-10-CM

## 2020-09-25 NOTE — TELEPHONE ENCOUNTER
From: Jocelyne Pringle  To: Yanet Bain MD  Sent: 2020 12:03 PM CDT  Subject: Other    Hi there- I'm almost due for my mammogram & thyroid ultrasound. Would you mind sending in the orders for both?  Just a reminder that I typically start with the \

## 2020-10-07 ENCOUNTER — HOSPITAL ENCOUNTER (OUTPATIENT)
Dept: ULTRASOUND IMAGING | Age: 50
Discharge: HOME | End: 2020-10-07
Attending: FAMILY MEDICINE
Payer: COMMERCIAL

## 2020-10-07 ENCOUNTER — HOSPITAL ENCOUNTER (OUTPATIENT)
Dept: MAMMOGRAPHY | Age: 50
Discharge: HOME | End: 2020-10-07
Attending: FAMILY MEDICINE
Payer: COMMERCIAL

## 2020-10-07 DIAGNOSIS — Z12.31 SCREENING MAMMOGRAM, ENCOUNTER FOR: ICD-10-CM

## 2020-10-07 DIAGNOSIS — E04.1 THYROID NODULE: ICD-10-CM

## 2020-10-07 PROCEDURE — 77067 SCR MAMMO BI INCL CAD: CPT | Performed by: FAMILY MEDICINE

## 2020-10-07 PROCEDURE — 76536 US EXAM OF HEAD AND NECK: CPT | Performed by: FAMILY MEDICINE

## 2020-10-07 PROCEDURE — 77063 BREAST TOMOSYNTHESIS BI: CPT | Performed by: FAMILY MEDICINE

## 2020-10-15 ENCOUNTER — HOSPITAL ENCOUNTER (OUTPATIENT)
Dept: ULTRASOUND IMAGING | Age: 50
Discharge: HOME | End: 2020-10-15
Attending: FAMILY MEDICINE
Payer: COMMERCIAL

## 2020-10-15 DIAGNOSIS — R92.2 INCONCLUSIVE MAMMOGRAM: ICD-10-CM

## 2020-10-15 PROCEDURE — 76641 ULTRASOUND BREAST COMPLETE: CPT | Performed by: FAMILY MEDICINE

## 2020-12-02 ENCOUNTER — PATIENT MESSAGE (OUTPATIENT)
Dept: FAMILY MEDICINE CLINIC | Facility: CLINIC | Age: 50
End: 2020-12-02

## 2020-12-02 NOTE — TELEPHONE ENCOUNTER
From: David Alexander  To: Son Jasmine MD  Sent: 12/2/2020 7:03 AM CST  Subject: Test Results Question    Good morning- I have my annual physical scheduled for 8am on Friday.  Is it possible to get my blood work done before then so the results would be

## 2020-12-04 ENCOUNTER — OFFICE VISIT (OUTPATIENT)
Dept: FAMILY MEDICINE CLINIC | Facility: CLINIC | Age: 50
End: 2020-12-04
Payer: COMMERCIAL

## 2020-12-04 VITALS
HEART RATE: 73 BPM | RESPIRATION RATE: 18 BRPM | TEMPERATURE: 98 F | DIASTOLIC BLOOD PRESSURE: 72 MMHG | HEIGHT: 64 IN | OXYGEN SATURATION: 99 % | WEIGHT: 149.38 LBS | SYSTOLIC BLOOD PRESSURE: 124 MMHG | BODY MASS INDEX: 25.5 KG/M2

## 2020-12-04 DIAGNOSIS — R41.3 MEMORY DIFFICULTIES: ICD-10-CM

## 2020-12-04 DIAGNOSIS — Z23 NEED FOR VACCINATION: ICD-10-CM

## 2020-12-04 DIAGNOSIS — Z00.00 ROUTINE HISTORY AND PHYSICAL EXAMINATION OF ADULT: Primary | ICD-10-CM

## 2020-12-04 DIAGNOSIS — R92.2 DENSE BREAST TISSUE ON MAMMOGRAM: ICD-10-CM

## 2020-12-04 DIAGNOSIS — E78.00 ELEVATED CHOLESTEROL: ICD-10-CM

## 2020-12-04 DIAGNOSIS — E55.9 VITAMIN D DEFICIENCY: ICD-10-CM

## 2020-12-04 DIAGNOSIS — E04.1 THYROID NODULE: ICD-10-CM

## 2020-12-04 PROCEDURE — 90471 IMMUNIZATION ADMIN: CPT | Performed by: FAMILY MEDICINE

## 2020-12-04 PROCEDURE — 80061 LIPID PANEL: CPT | Performed by: FAMILY MEDICINE

## 2020-12-04 PROCEDURE — 3074F SYST BP LT 130 MM HG: CPT | Performed by: FAMILY MEDICINE

## 2020-12-04 PROCEDURE — 36415 COLL VENOUS BLD VENIPUNCTURE: CPT | Performed by: FAMILY MEDICINE

## 2020-12-04 PROCEDURE — 83036 HEMOGLOBIN GLYCOSYLATED A1C: CPT | Performed by: FAMILY MEDICINE

## 2020-12-04 PROCEDURE — 99396 PREV VISIT EST AGE 40-64: CPT | Performed by: FAMILY MEDICINE

## 2020-12-04 PROCEDURE — 3078F DIAST BP <80 MM HG: CPT | Performed by: FAMILY MEDICINE

## 2020-12-04 PROCEDURE — 80050 GENERAL HEALTH PANEL: CPT | Performed by: FAMILY MEDICINE

## 2020-12-04 PROCEDURE — 3008F BODY MASS INDEX DOCD: CPT | Performed by: FAMILY MEDICINE

## 2020-12-04 PROCEDURE — 82306 VITAMIN D 25 HYDROXY: CPT | Performed by: FAMILY MEDICINE

## 2020-12-04 PROCEDURE — 82607 VITAMIN B-12: CPT | Performed by: FAMILY MEDICINE

## 2020-12-04 PROCEDURE — 82728 ASSAY OF FERRITIN: CPT | Performed by: FAMILY MEDICINE

## 2020-12-04 PROCEDURE — 86141 C-REACTIVE PROTEIN HS: CPT | Performed by: FAMILY MEDICINE

## 2020-12-04 PROCEDURE — 90715 TDAP VACCINE 7 YRS/> IM: CPT | Performed by: FAMILY MEDICINE

## 2020-12-04 RX ORDER — BUSPIRONE HYDROCHLORIDE 30 MG/1
TABLET ORAL
COMMUNITY
Start: 2020-11-07

## 2020-12-04 NOTE — PROGRESS NOTES
HPI:   David Alexander is a 52year old female who presents for a complete physical exam.      Patient complains of nothing major, feeling well overall.   Has some memory difficulties with finding words sometimes, not sure if started before starting meds Tab Take 250 mg by mouth.      • busPIRone HCl 30 MG Oral Tab TK SS T PO QAM AND ONE T PO QPM        Past Medical History:   Diagnosis Date   • Family history of malignant neoplasm of breast 4/13/11   • Iron deficiency anemia secondary to blood loss (chroni dominant or suspicious mass, no nipple discharge  LUNGS: clear to auscultation  CARDIO: RRR without murmur  GI: good BS's,no masses, HSM or tenderness  MUSCULOSKELETAL: back is not tender, FROM of UEs and LEs bilaterally  EXTREMITIES: no cyanosis, clubbing TSH W REFLEX TO FREE T4; Future    Thyroid nodule-check u/s in 1-2 years  -     VENIPUNCTURE  -     CBC WITH DIFFERENTIAL WITH PLATELET; Future  -     COMP METABOLIC PANEL (14); Future  -     LIPID PANEL;  Future  -     VITAMIN D, 25-HYDROXY; Future  -

## 2020-12-05 ENCOUNTER — PATIENT MESSAGE (OUTPATIENT)
Dept: FAMILY MEDICINE CLINIC | Facility: CLINIC | Age: 50
End: 2020-12-05

## 2020-12-05 DIAGNOSIS — E78.00 ELEVATED LDL CHOLESTEROL LEVEL: Primary | ICD-10-CM

## 2020-12-05 NOTE — TELEPHONE ENCOUNTER
From: Sheri Hills  To:  Lucy Thibodeaux MD  Sent: 12/5/2020 9:58 AM CST  Subject: Test Results Question    The F word is the only response I can come up with in regards to my cholesterol level!!   Seriously- brought tears to my eyes & the feeling of di in the future. I'm honestly really upset about my numbers!! From what I could understand- my cholesterol is the only red flag & everything else was ok- am I correct?  I don't recall seeing any thyroid levels- but we were on our way to dinner when I was look

## 2020-12-18 ENCOUNTER — HOSPITAL ENCOUNTER (OUTPATIENT)
Dept: CT IMAGING | Age: 50
Discharge: HOME | End: 2020-12-18
Attending: FAMILY MEDICINE

## 2020-12-18 DIAGNOSIS — E78.00 ELEVATED LDL CHOLESTEROL LEVEL: ICD-10-CM

## 2020-12-20 PROBLEM — R93.1 AGATSTON CORONARY ARTERY CALCIUM SCORE LESS THAN 100: Status: ACTIVE | Noted: 2020-12-20

## 2021-07-06 ENCOUNTER — TELEPHONE (OUTPATIENT)
Dept: FAMILY MEDICINE CLINIC | Facility: CLINIC | Age: 51
End: 2021-07-06

## 2021-07-06 NOTE — TELEPHONE ENCOUNTER
Last mammogram was 10/2020 and did show some cysts in both breasts. Should she follow when home from [facility]?

## 2021-07-06 NOTE — TELEPHONE ENCOUNTER
If still there when she gets back home f/u in office.   If develops redness, swelling, warmth to skin and/or fevers, chills or drainage from breast seek care in [place]

## 2021-07-06 NOTE — TELEPHONE ENCOUNTER
PT CALLED AND ADV THAT LAST NIGHT SHE FELT A HARD LUMP IN BREAST. SHE DOES HAVE LARGE DENSE BREASTS BUT HAS NEVER FELT THIS LUMP. PT LEAVING TO GO TO [place] TODAY - LEAVING BY NOON. PT DID ADV THAT SHE IS ON DAY 2 OF PERIOD.     LOOKING FOR RECOMMEND

## 2021-07-09 ENCOUNTER — OFFICE VISIT (OUTPATIENT)
Dept: FAMILY MEDICINE CLINIC | Facility: CLINIC | Age: 51
End: 2021-07-09
Payer: COMMERCIAL

## 2021-07-09 VITALS
WEIGHT: 147.25 LBS | SYSTOLIC BLOOD PRESSURE: 106 MMHG | HEIGHT: 63 IN | DIASTOLIC BLOOD PRESSURE: 70 MMHG | TEMPERATURE: 99 F | RESPIRATION RATE: 16 BRPM | HEART RATE: 77 BPM | BODY MASS INDEX: 26.09 KG/M2 | OXYGEN SATURATION: 98 %

## 2021-07-09 DIAGNOSIS — N63.15 BREAST LUMP ON RIGHT SIDE AT 9 O'CLOCK POSITION: Primary | ICD-10-CM

## 2021-07-09 PROCEDURE — 3008F BODY MASS INDEX DOCD: CPT | Performed by: FAMILY MEDICINE

## 2021-07-09 PROCEDURE — 3074F SYST BP LT 130 MM HG: CPT | Performed by: FAMILY MEDICINE

## 2021-07-09 PROCEDURE — 99214 OFFICE O/P EST MOD 30 MIN: CPT | Performed by: FAMILY MEDICINE

## 2021-07-09 PROCEDURE — 3078F DIAST BP <80 MM HG: CPT | Performed by: FAMILY MEDICINE

## 2021-07-09 NOTE — PROGRESS NOTES
[facility] Family Medicine Office Note  Chief Complaint:   Patient presents with:   Mass      HPI:   This is a 54year old female coming in for f/u on breast lump that she noticed  75/2021 - happened to be with her girlfriend and suddenly crossed QAM AND ONE T PO QPM     • buPROPion HCl ER, XL, 300 MG Oral Tablet 24 Hr TK 1 T PO QD  0   • Omega-3-acid Ethyl Esters 1 g Oral Cap Take 1 g by mouth daily. • ALPRAZolam 0.25 MG Oral Tab TK 1 T PO  QD PRA.   1   • Melatonin 10 MG Oral Cap Take 1 capsul fibrocystic disease / fibroadenoma. This was no commented on the prior Mamm 10.2020 - multiple cysts noted.      Total time spent on day of service: 31 mins           Meds & Refills for this Visit:  Requested Prescriptions      No prescriptions requested or

## 2021-07-09 NOTE — PROGRESS NOTES
Here for c/o lump on right side of right breast x 4 days. No previous injury. Tender to touch. No visible redness.

## 2021-07-16 ENCOUNTER — HOSPITAL ENCOUNTER (OUTPATIENT)
Dept: MAMMOGRAPHY | Facility: HOSPITAL | Age: 51
Discharge: HOME | End: 2021-07-16
Attending: FAMILY MEDICINE
Payer: COMMERCIAL

## 2021-07-16 DIAGNOSIS — N63.15 BREAST LUMP ON RIGHT SIDE AT 9 O'CLOCK POSITION: ICD-10-CM

## 2021-07-16 PROCEDURE — 76642 ULTRASOUND BREAST LIMITED: CPT | Performed by: FAMILY MEDICINE

## 2021-07-16 PROCEDURE — 77065 DX MAMMO INCL CAD UNI: CPT | Performed by: FAMILY MEDICINE

## 2021-07-16 PROCEDURE — 77061 BREAST TOMOSYNTHESIS UNI: CPT | Performed by: FAMILY MEDICINE

## 2021-07-23 ENCOUNTER — TELEPHONE (OUTPATIENT)
Dept: FAMILY MEDICINE CLINIC | Facility: CLINIC | Age: 51
End: 2021-07-23

## 2021-07-23 NOTE — TELEPHONE ENCOUNTER
----- Message from Nita Hartman MD sent at 7/16/2021  2:06 PM CDT -----  [facility] sent to the patient. Zeeshan Quinonez  Your mammogram and US results are back.  Noted cystic changes - that particular cyst was 1.5 x 1.4 x 1.1 cm cyst , and apparently t

## 2021-07-23 NOTE — TELEPHONE ENCOUNTER
Left detailed message to voicemail (per verbal release form consent with confirmed identifying message) of Doctor's note below. Patient advised to call office back with any questions/concerns.

## 2021-08-16 ENCOUNTER — PATIENT MESSAGE (OUTPATIENT)
Dept: FAMILY MEDICINE CLINIC | Facility: CLINIC | Age: 51
End: 2021-08-16

## 2021-08-16 DIAGNOSIS — Z12.11 ENCOUNTER FOR SCREENING COLONOSCOPY: Primary | ICD-10-CM

## 2021-08-16 NOTE — TELEPHONE ENCOUNTER
From: Joseph Palacio  To: Dodie Mike MD  Sent: 8/16/2021 9:01 AM CDT  Subject: Non-Urgent Medical Question    Morning- I read on My Chart that I'm past due for a colonoscopy. ..  I wasn't aware of that- I thought I needed to wait til I turn 50, which

## 2021-10-26 ENCOUNTER — OFFICE VISIT (OUTPATIENT)
Dept: FAMILY MEDICINE CLINIC | Facility: CLINIC | Age: 51
End: 2021-10-26
Payer: COMMERCIAL

## 2021-10-26 VITALS
OXYGEN SATURATION: 98 % | SYSTOLIC BLOOD PRESSURE: 126 MMHG | WEIGHT: 146 LBS | RESPIRATION RATE: 18 BRPM | HEART RATE: 75 BPM | HEIGHT: 63.5 IN | DIASTOLIC BLOOD PRESSURE: 60 MMHG | BODY MASS INDEX: 25.55 KG/M2 | TEMPERATURE: 98 F

## 2021-10-26 DIAGNOSIS — Z20.822 ENCOUNTER FOR LABORATORY TESTING FOR COVID-19 VIRUS: ICD-10-CM

## 2021-10-26 DIAGNOSIS — R51.9 ACUTE NONINTRACTABLE HEADACHE, UNSPECIFIED HEADACHE TYPE: ICD-10-CM

## 2021-10-26 DIAGNOSIS — J02.9 SORE THROAT: Primary | ICD-10-CM

## 2021-10-26 PROCEDURE — 3008F BODY MASS INDEX DOCD: CPT | Performed by: NURSE PRACTITIONER

## 2021-10-26 PROCEDURE — 99213 OFFICE O/P EST LOW 20 MIN: CPT | Performed by: NURSE PRACTITIONER

## 2021-10-26 PROCEDURE — 3074F SYST BP LT 130 MM HG: CPT | Performed by: NURSE PRACTITIONER

## 2021-10-26 PROCEDURE — 3078F DIAST BP <80 MM HG: CPT | Performed by: NURSE PRACTITIONER

## 2021-10-26 PROCEDURE — 87880 STREP A ASSAY W/OPTIC: CPT | Performed by: NURSE PRACTITIONER

## 2021-10-26 NOTE — PROGRESS NOTES
CHIEF COMPLAINT:   Patient presents with:  Sore Throat: started yesterday. No fevers. Denies strep or covid exposure. Headache      HPI:   Az Otto is a 54year old female who presents for covid-19 testing.  Patient reports sore throat and heada Smoker      Smokeless tobacco: Never Used      Tobacco comment: Non-smoker    Vaping Use      Vaping Use: Never used    Alcohol use: Yes      Comment: 2 per week    Drug use: Yes      Types: Cannabis        REVIEW OF SYSTEMS:   GENERAL: Denies fevers.  Note dysdiadochokinesis. No facial droop  EXTREMITIES: no cyanosis, clubbing or edema  LYMPH:  No lymphadenopathy.         ASSESSMENT AND PLAN:   (J02.9) Sore throat  (primary encounter diagnosis)  Plan: STREP A ASSAY W/OPTIC, SARS-COV-2 BY PCR         (LISE develop chest discomfort, wheezing, shortness of breath, severe or worsening headaches, any numbness/tingling, or if you have any concerns. When You Have a Sore Throat  A sore throat can be painful.  There are many reasons why you may have a sore t bad-tasting mucus? Physical exam  During the exam, your healthcare provider checks your ears, nose, and throat for problems.  He or she also checks for swelling in the neck, and may listen to your chest.   Possible tests  Other tests your healthcare provid antibiotics don’t treat viral illness. In fact, using antibiotics when they’re not needed may lead to bacteria that are harder to kill. Your healthcare provider will prescribe antibiotics only if he or she thinks they are likely to help.    If antibiotics a substitute for professional medical care. Always follow your healthcare professional's instructions. Self-Care for Headaches  Most headaches aren't serious and can be relieved with self-care.  But some headaches may be a sign of another health proble band around your head  · Pain in your neck or shoulders  · Headache without a definite beginning or end  · Headache after an activity such as driving or working on a computer  Signs of migraine  Any of the following can be signs:   · Throbbing pain on one Quarantine (for anyone in close contact with someone who has COVID-19)  Anyone who has been in close contact with someone who has COVID-19 should quarantine at home for 14 days from the time of exposure and follow the below recommendations.   If you vignesh recognizes that any quarantine shorter than 14 days balances reduced burden against a small possibility of spreading the virus. 10 Ways to Manage Your Health at Home      1. Stay home from work, school, and away from other public places.  If you must go exposure to COVID-19 and are concerned about your symptoms, please contact your health care provider with any questions.     Home Isolation  If you have tested positive for COVID-19, you should remain under home isolation precautions following the below [facility] blood that, in people who have recovered from COVID-19, contains antibodies against the virus. The antibodies in plasma can be used as a treatment for patients in our community who are most severely affected by the virus.     How can I donate convalescent p a wide range of new, returning, or ongoing health problems. Talk to your provider if you are not feeling well 4 or more weeks after being diagnosed with COVID-19.   Patients with Post-COVID conditions may experience one or more of the following symptoms:

## 2021-10-26 NOTE — PATIENT INSTRUCTIONS
1. Rest. Drink plenty of fluids. 2. Tylenol/Ibuprofen as directed for pain. 3. Salt water gargles three times daily  4. Use humidifier at home when possible. 5. The rapid strep test was negative today. 6. Covid-19 testing sent to lab.   Self quarantine treatment for you. The evaluation may include a health history, physical exam, and diagnostic tests. Health history  Your healthcare provider may ask you the following:   · How long has the sore throat lasted and how have you been treating it?   · Do you air moist and relieve throat dryness. · Try over-the-counter pain relievers such as acetaminophen or ibuprofen. Use as directed, and don’t exceed the recommended dose. Don’t give aspirin to children under age19. Are antibiotics needed?   If your sore th immediately. Any of these could signal an allergic reaction to antibiotics. · Symptoms don’t improve within a week. · Symptoms don’t improve within  2 to 3  days of starting antibiotics.   Call 911  Call 911 if any of the following occur:   · Trouble zita rebound headaches if they are taken too often. Check with your healthcare provider or pharmacist about your medicines.    Track your headaches  Keeping a headache diary can help you and your healthcare provider identify what's causing your headaches:   · No Always follow your healthcare professional's instructions. Coronavirus Disease 2019 (COVID-19)     [facility] is committed to the safety and well-being of our patients, members, employees, and communities.  As concerns arise about the new s exposure  • After 10 days without testing from date of last exposure  • After day 7 from date of last exposure with a negative test result (test must occur on day 5 or later)  After stopping quarantine, you should  • Watch for symptoms until 14 days after wipes according to the label instructions.          Seek Further Care     If you are awaiting test results or are confirmed positive for COVID -19, and your symptoms worsen at home with symptoms such as: extreme weakness, difficult breathing, or unrelenting provider or [facility]. Post-Discharge Follow-up  If you are diagnosed with COVID, refrain from exercise until approved by your primary care provider.  Please call your primary care provider within 2 days of your discharge to arrange for a [address].[employer].pt. pdf  Centers for Disease Control & Prevention (CDC)  10 things you can do to manage your health at home, Vee.nl. pdf  ht people    References:  Long haulers: Why some people experience long-term coronavirus symptoms. (2021, February 08). Retrieved March 17, 2021, from [email or web address]-vijay. html  Long-term effects of

## 2021-12-10 ENCOUNTER — OFFICE VISIT (OUTPATIENT)
Dept: FAMILY MEDICINE CLINIC | Facility: CLINIC | Age: 51
End: 2021-12-10
Payer: COMMERCIAL

## 2021-12-10 VITALS
SYSTOLIC BLOOD PRESSURE: 110 MMHG | DIASTOLIC BLOOD PRESSURE: 76 MMHG | HEIGHT: 63.5 IN | BODY MASS INDEX: 25.9 KG/M2 | RESPIRATION RATE: 16 BRPM | TEMPERATURE: 98 F | HEART RATE: 77 BPM | WEIGHT: 148 LBS | OXYGEN SATURATION: 100 %

## 2021-12-10 DIAGNOSIS — Z13.0 SCREENING, ANEMIA, DEFICIENCY, IRON: ICD-10-CM

## 2021-12-10 DIAGNOSIS — Z23 NEED FOR VACCINATION: ICD-10-CM

## 2021-12-10 DIAGNOSIS — Z13.1 SCREENING FOR DIABETES MELLITUS: ICD-10-CM

## 2021-12-10 DIAGNOSIS — E04.1 THYROID NODULE: ICD-10-CM

## 2021-12-10 DIAGNOSIS — E55.9 VITAMIN D DEFICIENCY: Primary | ICD-10-CM

## 2021-12-10 DIAGNOSIS — E78.00 ELEVATED CHOLESTEROL: ICD-10-CM

## 2021-12-10 DIAGNOSIS — Z12.31 SCREENING MAMMOGRAM, ENCOUNTER FOR: ICD-10-CM

## 2021-12-10 PROCEDURE — 3008F BODY MASS INDEX DOCD: CPT | Performed by: FAMILY MEDICINE

## 2021-12-10 PROCEDURE — 82306 VITAMIN D 25 HYDROXY: CPT | Performed by: FAMILY MEDICINE

## 2021-12-10 PROCEDURE — 3074F SYST BP LT 130 MM HG: CPT | Performed by: FAMILY MEDICINE

## 2021-12-10 PROCEDURE — 90686 IIV4 VACC NO PRSV 0.5 ML IM: CPT | Performed by: FAMILY MEDICINE

## 2021-12-10 PROCEDURE — 99396 PREV VISIT EST AGE 40-64: CPT | Performed by: FAMILY MEDICINE

## 2021-12-10 PROCEDURE — 3078F DIAST BP <80 MM HG: CPT | Performed by: FAMILY MEDICINE

## 2021-12-10 PROCEDURE — 80050 GENERAL HEALTH PANEL: CPT | Performed by: FAMILY MEDICINE

## 2021-12-10 PROCEDURE — 90471 IMMUNIZATION ADMIN: CPT | Performed by: FAMILY MEDICINE

## 2021-12-10 PROCEDURE — 80061 LIPID PANEL: CPT | Performed by: FAMILY MEDICINE

## 2021-12-10 RX ORDER — BUPROPION HYDROCHLORIDE 150 MG/1
150 TABLET, EXTENDED RELEASE ORAL DAILY
COMMUNITY

## 2021-12-10 RX ORDER — TRAZODONE HYDROCHLORIDE 50 MG/1
TABLET ORAL
COMMUNITY
Start: 2021-11-07

## 2021-12-10 NOTE — PROGRESS NOTES
[facility] Family Medicine Office Note  Chief Complaint:   Patient presents with:  Physical: Blood work, Left foot pinky toe possible still broken      HPI:   This is a 54year old female coming in for CPX     Labs due - cholesterol has always be Smoker      Smokeless tobacco: Never Used      Tobacco comment: Non-smoker    Vaping Use      Vaping Use: Never used    Alcohol use: Yes      Comment: 2 per week    Drug use: Yes      Types: Cannabis    Family History:  Family History   Problem Relation Ag as of this encounter: 5' 3.5\" (1.613 m). Weight as of this encounter: 148 lb (67.1 kg). Vital signs reviewed. Appears stated age, well groomed.     Physical Exam   GEN: Not in any acute distress, making good conversation, answering appropriately   SKIN medications, other tests and medical decision making.   Appropriate medical decision-making and tests are ordered as detailed in the plan of care above        Meds & Refills for this Visit:  Requested Prescriptions      No prescriptions requested or ordered

## 2021-12-11 ENCOUNTER — PATIENT MESSAGE (OUTPATIENT)
Dept: FAMILY MEDICINE CLINIC | Facility: CLINIC | Age: 51
End: 2021-12-11

## 2021-12-11 DIAGNOSIS — D64.9 LOW HEMOGLOBIN: ICD-10-CM

## 2021-12-11 DIAGNOSIS — Z13.0 SCREENING, ANEMIA, DEFICIENCY, IRON: Primary | ICD-10-CM

## 2021-12-13 NOTE — TELEPHONE ENCOUNTER
From: David Whitlock  To: Nita Romero MD  Sent: 12/11/2021 12:35 PM CST  Subject: Test results/cholesterol? ?     Good morning Dr Pipe Waddell!    As I’m reviewing my test results - I didn’t see my cholesterol levels anywhere, am I still waiti

## 2021-12-22 NOTE — TELEPHONE ENCOUNTER
[facility] sent to pt regarding Doctor's note below  Routing to nurse pool for follow-up message read by pt    \"Peter Ms Rodriguez Avery   All your labs are back. 1. Cholesterol still elevated but definitely noting some improvement from last year.  Keep doing what you

## 2022-01-06 ENCOUNTER — TELEMEDICINE (OUTPATIENT)
Dept: TELEHEALTH | Age: 52
End: 2022-01-06

## 2022-01-06 DIAGNOSIS — J34.89 SINUS PRESSURE: ICD-10-CM

## 2022-01-06 DIAGNOSIS — R09.81 NASAL CONGESTION: Primary | ICD-10-CM

## 2022-01-06 DIAGNOSIS — R53.83 OTHER FATIGUE: ICD-10-CM

## 2022-01-06 DIAGNOSIS — R51.9 ACUTE NONINTRACTABLE HEADACHE, UNSPECIFIED HEADACHE TYPE: ICD-10-CM

## 2022-01-06 DIAGNOSIS — R05.9 COUGH: ICD-10-CM

## 2022-01-06 DIAGNOSIS — R19.7 DIARRHEA, UNSPECIFIED TYPE: ICD-10-CM

## 2022-01-06 PROCEDURE — 99213 OFFICE O/P EST LOW 20 MIN: CPT | Performed by: NURSE PRACTITIONER

## 2022-01-06 NOTE — PROGRESS NOTES
Due to the real risk of possible exposure to Coronavirus (CoV-2, COVID-19) in the office/medical building and recommendations for social distancing (key to mitigation/limiting the spread of the virus) a Virtual or Telemedicine visit over the phone was perf above.  Coding/billing information is submitted for this visit based on complexity of care and/or time spent for the visit.     HPI:  Patient presents with:  Acute: URI symptoms      Sd Cage is a 54year old female who calls for complaints of: Tab Take 250 mg by mouth.        Past Medical History:   Diagnosis Date   • Anxiety    • Depression    • Family history of malignant neoplasm of breast 4/13/11   • Iron deficiency anemia secondary to blood loss (chronic) 5/26/10   • Nontoxic uninodular goit possible antibiotics. - SARS-COV-2 BY PCR ([facility]); Future    4. Other fatigue  -See above. - SARS-COV-2 BY PCR ([facility]); Future    5. Acute nonintractable headache, unspecified headache type  -See above, Advil/Tylenol PRN.   - SARS-COV-2 BY PCR ([facility]

## 2022-01-06 NOTE — PATIENT INSTRUCTIONS
Coronavirus Disease 2019 (COVID-19)     [facility] is committed to the safety and well-being of our patients, members, employees, and communities.  As concerns arise about the new strain of coronavirus that causes COVID-19, [facility] exposure  • After day 7 from date of last exposure with a negative test result (test must occur on day 5 or later)  After stopping quarantine, you should  • Watch for symptoms until 14 days after exposure.   • If you have symptoms, immediately self-isolate Care     If you are awaiting test results or are confirmed positive for COVID -19, and your symptoms worsen at home with symptoms such as: extreme weakness, difficult breathing, or unrelenting fevers greater than 100.4 degrees Fahrenheit, you should contac Follow-up  If you are diagnosed with COVID, refrain from exercise until approved by your primary care provider. Please call your primary care provider within 2 days of your discharge to arrange for a telehealth follow-up.  CDC does not recommend repeat test Control & Prevention (CDC)  10 things you can do to manage your health at home, Vee.nl. pdf  [email or web address] Retrieved March 17, 2021, from [email or web address]-19-information/covid-19-long-haulers. html  Long-term effects of covid-19. (n.d.).  Retrieved May 11, 2021, from [email or web address] (oxygen saturation, or O2 sat) may be checked often. This is to make sure your lungs are getting enough oxygen into your body. Your O2 sat level may be checked after each time you change position.   Getting ready for proning at home  Before you do prone pos needed. · If you have neck problems, you can fold a towel into a horseshoe shape to support your face. This will let you lie face down without turning your head to the side. · Try putting your arms in different positions to see what is most comfortable.

## 2022-01-28 ENCOUNTER — OFFICE VISIT (OUTPATIENT)
Dept: SURGERY | Facility: CLINIC | Age: 52
End: 2022-01-28
Payer: COMMERCIAL

## 2022-01-28 VITALS — WEIGHT: 148 LBS | TEMPERATURE: 99 F | BODY MASS INDEX: 25.9 KG/M2 | HEIGHT: 63.5 IN | HEART RATE: 75 BPM

## 2022-01-28 DIAGNOSIS — Z01.818 PRE-OP TESTING: Primary | ICD-10-CM

## 2022-01-28 PROCEDURE — 3008F BODY MASS INDEX DOCD: CPT | Performed by: SURGERY

## 2022-01-28 PROCEDURE — 99244 OFF/OP CNSLTJ NEW/EST MOD 40: CPT | Performed by: SURGERY

## 2022-01-28 RX ORDER — SODIUM, POTASSIUM,MAG SULFATES 17.5-3.13G
SOLUTION, RECONSTITUTED, ORAL ORAL
Qty: 1 EACH | Refills: 0 | Status: SHIPPED | OUTPATIENT
Start: 2022-01-28

## 2022-02-08 ENCOUNTER — LAB ENCOUNTER (OUTPATIENT)
Dept: LAB | Age: 52
End: 2022-02-08
Attending: SURGERY
Payer: COMMERCIAL

## 2022-02-08 DIAGNOSIS — Z01.818 PRE-OP TESTING: ICD-10-CM

## 2022-02-08 LAB — SARS-COV-2 RNA RESP QL NAA+PROBE: NOT DETECTED

## 2022-02-09 ENCOUNTER — HOSPITAL ENCOUNTER (OUTPATIENT)
Dept: ULTRASOUND IMAGING | Age: 52
Discharge: HOME | End: 2022-02-09
Attending: FAMILY MEDICINE
Payer: COMMERCIAL

## 2022-02-09 ENCOUNTER — HOSPITAL ENCOUNTER (OUTPATIENT)
Dept: MAMMOGRAPHY | Age: 52
Discharge: HOME | End: 2022-02-09
Attending: FAMILY MEDICINE
Payer: COMMERCIAL

## 2022-02-09 DIAGNOSIS — E04.1 THYROID NODULE: ICD-10-CM

## 2022-02-09 DIAGNOSIS — Z12.31 SCREENING MAMMOGRAM, ENCOUNTER FOR: ICD-10-CM

## 2022-02-09 PROCEDURE — 77063 BREAST TOMOSYNTHESIS BI: CPT | Performed by: FAMILY MEDICINE

## 2022-02-09 PROCEDURE — 77067 SCR MAMMO BI INCL CAD: CPT | Performed by: FAMILY MEDICINE

## 2022-02-09 PROCEDURE — 76536 US EXAM OF HEAD AND NECK: CPT | Performed by: FAMILY MEDICINE

## 2022-02-11 ENCOUNTER — LAB REQUISITION (OUTPATIENT)
Dept: LAB | Facility: HOSPITAL | Age: 52
End: 2022-02-11
Payer: COMMERCIAL

## 2022-02-11 PROCEDURE — 88305 TISSUE EXAM BY PATHOLOGIST: CPT | Performed by: SURGERY

## 2022-02-16 ENCOUNTER — PATIENT OUTREACH (OUTPATIENT)
Dept: SURGERY | Facility: CLINIC | Age: 52
End: 2022-02-16

## 2022-03-18 ENCOUNTER — TELEPHONE (OUTPATIENT)
Dept: FAMILY MEDICINE CLINIC | Facility: CLINIC | Age: 52
End: 2022-03-18

## 2022-03-18 NOTE — TELEPHONE ENCOUNTER
Letter mailed to patient reminding her she is due for labs.     Lab Frequency Next Occurrence   HEMOGLOBIN Once 12/22/2021

## 2022-04-26 ENCOUNTER — TELEPHONE (OUTPATIENT)
Dept: FAMILY MEDICINE CLINIC | Facility: CLINIC | Age: 52
End: 2022-04-26

## 2022-12-19 ENCOUNTER — TELEPHONE (OUTPATIENT)
Dept: FAMILY MEDICINE CLINIC | Facility: CLINIC | Age: 52
End: 2022-12-19

## 2022-12-19 NOTE — TELEPHONE ENCOUNTER
Advised patient of Doctor's note below. Patient verbalized understanding.      Future Appointments   Date Time Provider [place]   12/30/2022 10:40 AM Clinton Weir MD [facility] CARRI Quiros

## 2022-12-19 NOTE — TELEPHONE ENCOUNTER
Have her discontinue all stimulants such as caffeine drinks and keep very well hydrated. Avoid any decongestants such as Sudafed like medication. Also her Hgb was low last year - did she start supplementing like we discussed ? Please put her on my schedule in the next 2 weeks. Thank you.  Of course, if any worsening or dizziness and drowsiness associated with the palpitations then she should be seen in the ER/IC

## 2022-12-19 NOTE — TELEPHONE ENCOUNTER
Pt reports feeling of \"hearttbeast surging\" is not regular  Sometimes notices at home, today- at work    Pt describes \"surge\" as like panic of anxiety - then goes away - but pt reports she is not in anxious state - only lasts seconds - then goes away    Does take medication for anxiety    Feels \"fluttler\" in chest - reports pulse does not feel \"crazy\"    Pt reports this has been happening for past couple months  Not getting worse - but is now paying more attention    No lightheadedness, no dizziness  No new medications past few months    Need to be seen sooner?   Please advise, thank you    Future Appointments   Date Time Provider [place]   1/18/2023  3:20 PM Nita Ivan MD [facility] CAR[facility] Guzman

## 2022-12-23 ENCOUNTER — OFFICE VISIT (OUTPATIENT)
Dept: FAMILY MEDICINE CLINIC | Facility: CLINIC | Age: 52
End: 2022-12-23
Payer: COMMERCIAL

## 2022-12-23 VITALS
HEIGHT: 63.5 IN | OXYGEN SATURATION: 100 % | SYSTOLIC BLOOD PRESSURE: 118 MMHG | WEIGHT: 154.19 LBS | HEART RATE: 81 BPM | DIASTOLIC BLOOD PRESSURE: 78 MMHG | BODY MASS INDEX: 26.98 KG/M2 | TEMPERATURE: 98 F

## 2022-12-23 DIAGNOSIS — N92.1 MENORRHAGIA WITH IRREGULAR CYCLE: ICD-10-CM

## 2022-12-23 DIAGNOSIS — R73.01 ELEVATED FASTING BLOOD SUGAR: ICD-10-CM

## 2022-12-23 DIAGNOSIS — D64.9 ANEMIA, UNSPECIFIED TYPE: ICD-10-CM

## 2022-12-23 DIAGNOSIS — E78.00 ELEVATED CHOLESTEROL: ICD-10-CM

## 2022-12-23 DIAGNOSIS — R00.2 PALPITATIONS: Primary | ICD-10-CM

## 2022-12-23 PROCEDURE — 3078F DIAST BP <80 MM HG: CPT | Performed by: FAMILY MEDICINE

## 2022-12-23 PROCEDURE — 3008F BODY MASS INDEX DOCD: CPT | Performed by: FAMILY MEDICINE

## 2022-12-23 PROCEDURE — 3074F SYST BP LT 130 MM HG: CPT | Performed by: FAMILY MEDICINE

## 2022-12-23 PROCEDURE — 99214 OFFICE O/P EST MOD 30 MIN: CPT | Performed by: FAMILY MEDICINE

## 2022-12-28 ENCOUNTER — TELEPHONE (OUTPATIENT)
Dept: FAMILY MEDICINE CLINIC | Facility: CLINIC | Age: 52
End: 2022-12-28

## 2022-12-28 DIAGNOSIS — R00.2 PALPITATIONS: ICD-10-CM

## 2022-12-28 DIAGNOSIS — R73.01 ELEVATED FASTING BLOOD SUGAR: ICD-10-CM

## 2022-12-28 DIAGNOSIS — E78.00 ELEVATED CHOLESTEROL: ICD-10-CM

## 2022-12-28 DIAGNOSIS — D64.9 ANEMIA, UNSPECIFIED TYPE: Primary | ICD-10-CM

## 2022-12-28 DIAGNOSIS — D64.9 LOW HEMOGLOBIN: ICD-10-CM

## 2022-12-28 DIAGNOSIS — Z13.0 SCREENING, ANEMIA, DEFICIENCY, IRON: ICD-10-CM

## 2022-12-28 DIAGNOSIS — E04.1 THYROID NODULE: ICD-10-CM

## 2022-12-28 DIAGNOSIS — N92.1 MENORRHAGIA WITH IRREGULAR CYCLE: ICD-10-CM

## 2022-12-28 NOTE — TELEPHONE ENCOUNTER
Advised patient of note below. Patient verbalized understanding. No further questions at this time.     Advised pt to fast for labs - she v/u    Future Appointments   Date Time Provider Wood [place]   12/30/2022  9:00 AM [facility] NURSE [place] [facility] Christina Goldsmith   1/13/2023  8:20 AM Ed Ahumada, Mydhili, MD [facility] Misael Levers PLACED

## 2022-12-28 NOTE — TELEPHONE ENCOUNTER
Pt had labs collected ion 12/23    Labs were unable to be run    Please order and collect:  CMP, Ferritin, Lipid, Iron and TIBS, TSH w/T4  Vitamin A41, Folic Acid, CBC and P1Z    Please contact to schedule NV- no venipuncture charge

## 2022-12-30 ENCOUNTER — NURSE ONLY (OUTPATIENT)
Dept: FAMILY MEDICINE CLINIC | Facility: CLINIC | Age: 52
End: 2022-12-30
Payer: COMMERCIAL

## 2022-12-30 DIAGNOSIS — R73.01 ELEVATED FASTING BLOOD SUGAR: ICD-10-CM

## 2022-12-30 DIAGNOSIS — D64.9 ANEMIA, UNSPECIFIED TYPE: ICD-10-CM

## 2022-12-30 DIAGNOSIS — R00.2 PALPITATIONS: ICD-10-CM

## 2022-12-30 DIAGNOSIS — Z13.0 SCREENING, ANEMIA, DEFICIENCY, IRON: ICD-10-CM

## 2022-12-30 DIAGNOSIS — N92.1 MENORRHAGIA WITH IRREGULAR CYCLE: ICD-10-CM

## 2022-12-30 DIAGNOSIS — E78.00 ELEVATED CHOLESTEROL: ICD-10-CM

## 2022-12-30 DIAGNOSIS — D64.9 LOW HEMOGLOBIN: ICD-10-CM

## 2022-12-30 LAB
ALBUMIN SERPL-MCNC: 3.7 G/DL (ref 3.4–5)
ALBUMIN/GLOB SERPL: 1.3 {RATIO} (ref 1–2)
ALP LIVER SERPL-CCNC: 81 U/L
ALT SERPL-CCNC: 22 U/L
ANION GAP SERPL CALC-SCNC: 1 MMOL/L (ref 0–18)
AST SERPL-CCNC: 13 U/L (ref 15–37)
BASOPHILS # BLD AUTO: 0.06 X10(3) UL (ref 0–0.2)
BASOPHILS NFR BLD AUTO: 1.1 %
BILIRUB SERPL-MCNC: 0.3 MG/DL (ref 0.1–2)
BUN BLD-MCNC: 14 MG/DL (ref 7–18)
CALCIUM BLD-MCNC: 9.1 MG/DL (ref 8.5–10.1)
CHLORIDE SERPL-SCNC: 107 MMOL/L (ref 98–112)
CHOLEST SERPL-MCNC: 219 MG/DL (ref ?–200)
CO2 SERPL-SCNC: 29 MMOL/L (ref 21–32)
CREAT BLD-MCNC: 0.96 MG/DL
DEPRECATED HBV CORE AB SER IA-ACNC: 21 NG/ML
EOSINOPHIL # BLD AUTO: 0.1 X10(3) UL (ref 0–0.7)
EOSINOPHIL NFR BLD AUTO: 1.8 %
ERYTHROCYTE [DISTWIDTH] IN BLOOD BY AUTOMATED COUNT: 12.7 %
EST. AVERAGE GLUCOSE BLD GHB EST-MCNC: 108 MG/DL (ref 68–126)
FASTING PATIENT LIPID ANSWER: YES
FASTING STATUS PATIENT QL REPORTED: YES
FOLATE SERPL-MCNC: 18.5 NG/ML (ref 8.7–?)
GFR SERPLBLD BASED ON 1.73 SQ M-ARVRAT: 72 ML/MIN/1.73M2 (ref 60–?)
GLOBULIN PLAS-MCNC: 2.8 G/DL (ref 2.8–4.4)
GLUCOSE BLD-MCNC: 102 MG/DL (ref 70–99)
HBA1C MFR BLD: 5.4 % (ref ?–5.7)
HCT VFR BLD AUTO: 41.8 %
HDLC SERPL-MCNC: 68 MG/DL (ref 40–59)
HGB BLD-MCNC: 13.4 G/DL
IMM GRANULOCYTES # BLD AUTO: 0.01 X10(3) UL (ref 0–1)
IMM GRANULOCYTES NFR BLD: 0.2 %
IRON SATN MFR SERPL: 16 %
IRON SERPL-MCNC: 59 UG/DL
LDLC SERPL CALC-MCNC: 139 MG/DL (ref ?–100)
LYMPHOCYTES # BLD AUTO: 2 X10(3) UL (ref 1–4)
LYMPHOCYTES NFR BLD AUTO: 35.7 %
MCH RBC QN AUTO: 28.6 PG (ref 26–34)
MCHC RBC AUTO-ENTMCNC: 32.1 G/DL (ref 31–37)
MCV RBC AUTO: 89.1 FL
MONOCYTES # BLD AUTO: 0.43 X10(3) UL (ref 0.1–1)
MONOCYTES NFR BLD AUTO: 7.7 %
NEUTROPHILS # BLD AUTO: 3 X10 (3) UL (ref 1.5–7.7)
NEUTROPHILS # BLD AUTO: 3 X10(3) UL (ref 1.5–7.7)
NEUTROPHILS NFR BLD AUTO: 53.5 %
NONHDLC SERPL-MCNC: 151 MG/DL (ref ?–130)
OSMOLALITY SERPL CALC.SUM OF ELEC: 285 MOSM/KG (ref 275–295)
PLATELET # BLD AUTO: 284 10(3)UL (ref 150–450)
POTASSIUM SERPL-SCNC: 4.2 MMOL/L (ref 3.5–5.1)
PROT SERPL-MCNC: 6.5 G/DL (ref 6.4–8.2)
RBC # BLD AUTO: 4.69 X10(6)UL
SODIUM SERPL-SCNC: 137 MMOL/L (ref 136–145)
TIBC SERPL-MCNC: 370 UG/DL (ref 240–450)
TRANSFERRIN SERPL-MCNC: 248 MG/DL (ref 200–360)
TRIGL SERPL-MCNC: 67 MG/DL (ref 30–149)
TSI SER-ACNC: 1.02 MIU/ML (ref 0.36–3.74)
VIT B12 SERPL-MCNC: 843 PG/ML (ref 193–986)
VLDLC SERPL CALC-MCNC: 12 MG/DL (ref 0–30)
WBC # BLD AUTO: 5.6 X10(3) UL (ref 4–11)

## 2022-12-30 PROCEDURE — 82746 ASSAY OF FOLIC ACID SERUM: CPT | Performed by: FAMILY MEDICINE

## 2022-12-30 PROCEDURE — 80061 LIPID PANEL: CPT | Performed by: FAMILY MEDICINE

## 2022-12-30 PROCEDURE — 82728 ASSAY OF FERRITIN: CPT | Performed by: FAMILY MEDICINE

## 2022-12-30 PROCEDURE — 83550 IRON BINDING TEST: CPT | Performed by: FAMILY MEDICINE

## 2022-12-30 PROCEDURE — 83540 ASSAY OF IRON: CPT | Performed by: FAMILY MEDICINE

## 2022-12-30 PROCEDURE — 82607 VITAMIN B-12: CPT | Performed by: FAMILY MEDICINE

## 2022-12-30 PROCEDURE — 80050 GENERAL HEALTH PANEL: CPT | Performed by: FAMILY MEDICINE

## 2022-12-30 PROCEDURE — 83036 HEMOGLOBIN GLYCOSYLATED A1C: CPT | Performed by: FAMILY MEDICINE

## 2022-12-30 NOTE — PROGRESS NOTES
Jose Conley present in office for nurse visit. Labs as ordered by Dr. Alejandra Sit; 22 g, Left AC, lavender tube, green tube and gold tube   No charge     All questions/concerns addressed. Patient left in stable condition. \

## 2023-01-13 ENCOUNTER — OFFICE VISIT (OUTPATIENT)
Dept: FAMILY MEDICINE CLINIC | Facility: CLINIC | Age: 53
End: 2023-01-13
Payer: COMMERCIAL

## 2023-01-13 VITALS
BODY MASS INDEX: 27.04 KG/M2 | TEMPERATURE: 98 F | DIASTOLIC BLOOD PRESSURE: 84 MMHG | SYSTOLIC BLOOD PRESSURE: 122 MMHG | WEIGHT: 152.63 LBS | OXYGEN SATURATION: 98 % | HEART RATE: 72 BPM | HEIGHT: 63 IN

## 2023-01-13 DIAGNOSIS — Z00.00 ANNUAL PHYSICAL EXAM: Primary | ICD-10-CM

## 2023-01-13 DIAGNOSIS — E04.1 THYROID NODULE: ICD-10-CM

## 2023-01-13 DIAGNOSIS — Z23 NEED FOR VACCINATION: ICD-10-CM

## 2023-01-13 DIAGNOSIS — Z12.31 ENCOUNTER FOR SCREENING MAMMOGRAM FOR MALIGNANT NEOPLASM OF BREAST: ICD-10-CM

## 2023-01-13 PROCEDURE — 90471 IMMUNIZATION ADMIN: CPT | Performed by: FAMILY MEDICINE

## 2023-01-13 PROCEDURE — 3074F SYST BP LT 130 MM HG: CPT | Performed by: FAMILY MEDICINE

## 2023-01-13 PROCEDURE — 99396 PREV VISIT EST AGE 40-64: CPT | Performed by: FAMILY MEDICINE

## 2023-01-13 PROCEDURE — 3079F DIAST BP 80-89 MM HG: CPT | Performed by: FAMILY MEDICINE

## 2023-01-13 PROCEDURE — 3008F BODY MASS INDEX DOCD: CPT | Performed by: FAMILY MEDICINE

## 2023-01-13 PROCEDURE — 90750 HZV VACC RECOMBINANT IM: CPT | Performed by: FAMILY MEDICINE

## 2023-02-15 ENCOUNTER — HOSPITAL ENCOUNTER (OUTPATIENT)
Dept: MAMMOGRAPHY | Age: 53
Discharge: HOME | End: 2023-02-15
Attending: FAMILY MEDICINE
Payer: COMMERCIAL

## 2023-02-15 DIAGNOSIS — Z12.31 ENCOUNTER FOR SCREENING MAMMOGRAM FOR MALIGNANT NEOPLASM OF BREAST: ICD-10-CM

## 2023-02-15 PROCEDURE — 77063 BREAST TOMOSYNTHESIS BI: CPT | Performed by: FAMILY MEDICINE

## 2023-02-15 PROCEDURE — 77067 SCR MAMMO BI INCL CAD: CPT | Performed by: FAMILY MEDICINE

## 2023-08-04 ENCOUNTER — TELEPHONE (OUTPATIENT)
Dept: FAMILY MEDICINE CLINIC | Facility: CLINIC | Age: 53
End: 2023-08-04

## 2023-08-04 ENCOUNTER — NURSE ONLY (OUTPATIENT)
Dept: FAMILY MEDICINE CLINIC | Facility: CLINIC | Age: 53
End: 2023-08-04
Payer: COMMERCIAL

## 2023-08-04 DIAGNOSIS — Z23 ENCOUNTER FOR IMMUNIZATION: Primary | ICD-10-CM

## 2023-08-04 PROCEDURE — 90471 IMMUNIZATION ADMIN: CPT | Performed by: NURSE PRACTITIONER

## 2023-08-04 PROCEDURE — 90750 HZV VACC RECOMBINANT IM: CPT | Performed by: NURSE PRACTITIONER

## 2023-08-04 NOTE — PROGRESS NOTES
Aura Lux present in office for nurse visit. Shingrix Vaccine(s) as ordered by Jaky Thibodeaux and Expiration date verified with Nataliia Ellis RN  Administered to right deltoid  VIS sheet(s) given to pt      All questions/concerns addressed. Patient left in stable condition.

## 2023-08-22 ENCOUNTER — TELEPHONE (OUTPATIENT)
Dept: FAMILY MEDICINE CLINIC | Facility: CLINIC | Age: 53
End: 2023-08-22

## 2023-08-22 NOTE — TELEPHONE ENCOUNTER
Patient spouse is [facility] patient)    Patient would like to transfer care to Dr. Mayte Wadsworth    She is having some ongoing neck and hip pain    Please adv  Thank you

## 2023-08-30 ENCOUNTER — OFFICE VISIT (OUTPATIENT)
Dept: FAMILY MEDICINE CLINIC | Facility: CLINIC | Age: 53
End: 2023-08-30
Payer: COMMERCIAL

## 2023-08-30 VITALS
WEIGHT: 165.38 LBS | HEIGHT: 63.5 IN | SYSTOLIC BLOOD PRESSURE: 146 MMHG | DIASTOLIC BLOOD PRESSURE: 82 MMHG | HEART RATE: 84 BPM | OXYGEN SATURATION: 97 % | TEMPERATURE: 98 F | BODY MASS INDEX: 28.94 KG/M2

## 2023-08-30 DIAGNOSIS — R03.0 ELEVATED BLOOD PRESSURE READING WITHOUT DIAGNOSIS OF HYPERTENSION: ICD-10-CM

## 2023-08-30 DIAGNOSIS — R42 DIZZINESS: Primary | ICD-10-CM

## 2023-08-30 PROCEDURE — 99214 OFFICE O/P EST MOD 30 MIN: CPT | Performed by: NURSE PRACTITIONER

## 2023-08-30 PROCEDURE — 3077F SYST BP >= 140 MM HG: CPT | Performed by: NURSE PRACTITIONER

## 2023-08-30 PROCEDURE — 3079F DIAST BP 80-89 MM HG: CPT | Performed by: NURSE PRACTITIONER

## 2023-08-30 PROCEDURE — 3008F BODY MASS INDEX DOCD: CPT | Performed by: NURSE PRACTITIONER

## 2023-08-30 RX ORDER — MECLIZINE HCL 12.5 MG/1
12.5 TABLET ORAL 3 TIMES DAILY PRN
Qty: 30 TABLET | Refills: 0 | Status: SHIPPED | OUTPATIENT
Start: 2023-08-30

## 2023-08-30 RX ORDER — ACYCLOVIR 200 MG/1
CAPSULE ORAL
COMMUNITY

## 2023-09-01 ENCOUNTER — TELEPHONE (OUTPATIENT)
Dept: FAMILY MEDICINE CLINIC | Facility: CLINIC | Age: 53
End: 2023-09-01

## 2023-09-06 ENCOUNTER — HOSPITAL ENCOUNTER (OUTPATIENT)
Dept: CT IMAGING | Age: 53
Discharge: HOME | End: 2023-09-06
Attending: NURSE PRACTITIONER
Payer: COMMERCIAL

## 2023-09-06 DIAGNOSIS — R42 DIZZINESS: ICD-10-CM

## 2023-09-06 PROCEDURE — 70450 CT HEAD/BRAIN W/O DYE: CPT | Performed by: NURSE PRACTITIONER

## 2023-09-07 ENCOUNTER — MED REC SCAN ONLY (OUTPATIENT)
Dept: FAMILY MEDICINE CLINIC | Facility: CLINIC | Age: 53
End: 2023-09-07

## 2023-09-21 ENCOUNTER — TELEPHONE (OUTPATIENT)
Dept: NEUROLOGY | Facility: CLINIC | Age: 53
End: 2023-09-21

## 2023-12-20 ENCOUNTER — OFFICE VISIT (OUTPATIENT)
Dept: FAMILY MEDICINE CLINIC | Facility: CLINIC | Age: 53
End: 2023-12-20
Payer: COMMERCIAL

## 2023-12-20 VITALS
HEART RATE: 83 BPM | TEMPERATURE: 98 F | WEIGHT: 150.25 LBS | RESPIRATION RATE: 16 BRPM | SYSTOLIC BLOOD PRESSURE: 132 MMHG | BODY MASS INDEX: 26 KG/M2 | DIASTOLIC BLOOD PRESSURE: 88 MMHG | OXYGEN SATURATION: 98 %

## 2023-12-20 DIAGNOSIS — R03.0 ELEVATED BLOOD PRESSURE READING WITHOUT DIAGNOSIS OF HYPERTENSION: Primary | ICD-10-CM

## 2023-12-20 DIAGNOSIS — R42 DIZZINESS: ICD-10-CM

## 2023-12-20 PROBLEM — N94.3 PMS (PREMENSTRUAL SYNDROME): Status: RESOLVED | Noted: 2018-06-21 | Resolved: 2023-12-20

## 2023-12-20 PROBLEM — M19.011 ARTHRITIS OF RIGHT ACROMIOCLAVICULAR JOINT: Status: ACTIVE | Noted: 2023-10-19

## 2023-12-20 PROCEDURE — 3075F SYST BP GE 130 - 139MM HG: CPT | Performed by: FAMILY MEDICINE

## 2023-12-20 PROCEDURE — 99214 OFFICE O/P EST MOD 30 MIN: CPT | Performed by: FAMILY MEDICINE

## 2023-12-20 PROCEDURE — 3079F DIAST BP 80-89 MM HG: CPT | Performed by: FAMILY MEDICINE

## 2023-12-20 RX ORDER — BUPROPION HYDROCHLORIDE 150 MG/1
TABLET ORAL
COMMUNITY
Start: 2023-11-20

## 2023-12-20 NOTE — PROGRESS NOTES
Shayla Millan is a 48year old female. HPI:   Serenity was noted to have elevated BP the last tie she was here. She has been checking her BP at home and has a couple of elevated readings 621'M systolic and 26'B as a high for the diastolic. She has no  issues with [facility], she has a HX of anxiety, and is taking her meds religiously. She has had some episodes of dizziness in the past couple of month (lightheadedness), but she had vertigo the last time she was here demonstrated on exam, and was given exercise to perform and seemed to help. She had some nausea as well, no HX of motion sickness, no HX of congestion, no allergies. Was given meclizine but never took it . She looked up exercises for vertigo, and was doing better. Current Outpatient Medications   Medication Sig Dispense Refill    acyclovir 200 MG Oral Cap TAKE ONE CAPSULE BY MOUTH FOUR TIMES DAILY WHILE LESION IS PRESENT AS DIRECTED      meclizine 12.5 MG Oral Tab Take 1 tablet (12.5 mg total) by mouth 3 (three) times daily as needed. 30 tablet 0    Zinc Sulfate (ZINC 15 OR) Take by mouth daily. busPIRone HCl 30 MG Oral Tab TK SS T PO QAM AND ONE T PO QPM      buPROPion HCl ER, XL, 300 MG Oral Tablet 24 Hr TK 1 T PO QD  0    Omega-3-acid Ethyl Esters 1 g Oral Cap Take 1 capsule (1 g total) by mouth daily. ALPRAZolam 0.25 MG Oral Tab TK 1 T PO  QD PRA. 1    Melatonin 10 MG Oral Cap Take 1 capsule by mouth nightly as needed.         Past Medical History:   Diagnosis Date    Anxiety     Depression     Family history of malignant neoplasm of breast 4/13/11    Iron deficiency anemia secondary to blood loss (chronic) 5/26/10    Nontoxic uninodular goiter 11/25/09    Thyroid nodule    Perforation of tympanic membrane, unspecified 8/12/09      Social History:  Social History     Socioeconomic History    Marital status:     Number of children: 4   Occupational History    Occupation: Dental Assistant   Tobacco Use    Smoking status: Never Smokeless tobacco: Never    Tobacco comments:     Non-smoker   Vaping Use    Vaping Use: Never used   Substance and Sexual Activity    Alcohol use: Yes     Comment: 5 per week    Drug use: Yes     Types: Cannabis        REVIEW OF SYSTEMS:   GENERAL HEALTH: feels well otherwise  HEENT: HX of recurrent ear infections and tubes as a child and adult  SKIN: denies any unusual skin lesions or rashes  RESPIRATORY: denies shortness of breath with exertion  CARDIOVASCULAR: denies chest pain on exertion, has some elevated BP readings  GI: denies abdominal pain and denies heartburn  NEURO: had some headache  and some vertigo    EXAM:   LMP 08/25/2023 (Exact Date)   GENERAL: well developed, well nourished,in no apparent distress  SKIN: no rashes,no suspicious lesions  HEENT: atraumatic, normocephalic,ears and throat are clear  NECK: supple,no adenopathy,no bruits  LUNGS: clear to auscultation  CARDIO: RRR without murmur  GI: good BS's,no masses, HSM or tenderness  EXTREMITIES: no cyanosis, clubbing or edema  Neuro: had mild nystagmus with lateral eye movement  ASSESSMENT AND PLAN:     Encounter Diagnoses   Name Primary? Elevated blood pressure reading without diagnosis of hypertension Yes    Dizziness        BP recheck, in 2 weeks, bring in home readings as well, no added salt, ok to do aerobic activity,  also continue , Epley maneuvers      Meds & Refills for this Visit:  Requested Prescriptions      No prescriptions requested or ordered in this encounter       Imaging & Consults:  None     The patient indicates understanding of these issues and agrees to the plan. The patient is asked to return in 2 weeks.

## 2024-01-03 ENCOUNTER — OFFICE VISIT (OUTPATIENT)
Dept: FAMILY MEDICINE CLINIC | Facility: CLINIC | Age: 54
End: 2024-01-03
Payer: COMMERCIAL

## 2024-01-03 VITALS
OXYGEN SATURATION: 98 % | HEIGHT: 63.5 IN | SYSTOLIC BLOOD PRESSURE: 138 MMHG | DIASTOLIC BLOOD PRESSURE: 70 MMHG | BODY MASS INDEX: 27.51 KG/M2 | TEMPERATURE: 98 F | WEIGHT: 157.19 LBS | HEART RATE: 84 BPM

## 2024-01-03 DIAGNOSIS — R03.0 ELEVATED BLOOD PRESSURE READING: ICD-10-CM

## 2024-01-03 DIAGNOSIS — Z12.31 ENCOUNTER FOR SCREENING MAMMOGRAM FOR BREAST CANCER: Primary | ICD-10-CM

## 2024-01-03 DIAGNOSIS — Z00.00 ROUTINE HEALTH MAINTENANCE: ICD-10-CM

## 2024-01-03 PROCEDURE — 3078F DIAST BP <80 MM HG: CPT | Performed by: FAMILY MEDICINE

## 2024-01-03 PROCEDURE — 99214 OFFICE O/P EST MOD 30 MIN: CPT | Performed by: FAMILY MEDICINE

## 2024-01-03 PROCEDURE — 3008F BODY MASS INDEX DOCD: CPT | Performed by: FAMILY MEDICINE

## 2024-01-03 PROCEDURE — 3075F SYST BP GE 130 - 139MM HG: CPT | Performed by: FAMILY MEDICINE

## 2024-01-03 NOTE — PROGRESS NOTES
Devi Gonzalez is a 53 year old female.  HPI:   Serenity was noted to have elevated BP the last tie she was here. She has been checking her BP at home and has a couple of elevated readings 150's systolic and 90's as a high for the diastolic. She has no  issues with [facility], she has a HX of anxiety, and is taking her meds religiously. She has had some episodes of dizziness in the past couple of month (lightheadedness), but she had vertigo the last time she was here demonstrated on exam, and was given exercise to perform and seemed to help. She had some nausea as well, no HX of motion sickness, no HX of congestion, no allergies. Was given meclizine but never took it .  She looked up exercises for vertigo, and was doing better. She has been checking her BP at home and the highest she's seen was 138/94, lowest was 108/74, and then , her average has been relatively low  and she has for the most part felt well. She has cut out the salt and watching caffeine and stimulants    Current Outpatient Medications   Medication Sig Dispense Refill    buPROPion  MG Oral Tablet 24 Hr TAKE 1 TABLET DAILY. TAKE WITH THE 300MG TABLET      Magnesium 400 MG Oral Cap       acyclovir 200 MG Oral Cap TAKE ONE CAPSULE BY MOUTH FOUR TIMES DAILY WHILE LESION IS PRESENT AS DIRECTED      Zinc Sulfate (ZINC 15 OR) Take by mouth daily.      busPIRone HCl 30 MG Oral Tab TK SS T PO QAM AND ONE T PO QPM      buPROPion HCl ER, XL, 300 MG Oral Tablet 24 Hr TK 1 T PO QD  0    Omega-3-acid Ethyl Esters 1 g Oral Cap Take 1 capsule (1 g total) by mouth daily.      ALPRAZolam 0.25 MG Oral Tab TK 1 T PO  QD PRA.  1    Melatonin 10 MG Oral Cap Take 1 capsule by mouth nightly as needed.        Past Medical History:   Diagnosis Date    Anxiety     Depression     Family history of malignant neoplasm of breast 4/13/11    Iron deficiency anemia secondary to blood loss (chronic) 5/26/10    Nontoxic uninodular goiter 11/25/09    Thyroid nodule    Perforation of  tympanic membrane, unspecified 8/12/09      Social History:  Social History     Socioeconomic History    Marital status:     Number of children: 4   Occupational History    Occupation: Dental Assistant   Tobacco Use    Smoking status: Never    Smokeless tobacco: Never    Tobacco comments:     Non-smoker   Vaping Use    Vaping Use: Never used   Substance and Sexual Activity    Alcohol use: Yes     Comment: socially    Drug use: Yes     Types: Cannabis        REVIEW OF SYSTEMS:   GENERAL HEALTH: feels well otherwise  HEENT: HX of recurrent ear infections and tubes as a child and adult  SKIN: denies any unusual skin lesions or rashes  RESPIRATORY: denies shortness of breath with exertion  CARDIOVASCULAR: denies chest pain on exertion, her BP readings are better  GI: denies abdominal pain and denies heartburn  NEURO: had some headache  and some vertigo    EXAM:   /70   Pulse 84   Temp 98.4 °F (36.9 °C) (Temporal)   Ht 5' 3.5\" (1.613 m)   Wt 157 lb 3.2 oz (71.3 kg)   LMP 11/29/2023 (Exact Date)   SpO2 98%   BMI 27.41 kg/m²   GENERAL: well developed, well nourished,in no apparent distress  SKIN: no rashes,no suspicious lesions  HEENT: atraumatic, normocephalic,ears and throat are clear  NECK: supple,no adenopathy,no bruits  LUNGS: clear to auscultation  CARDIO: RRR without murmur  GI: good BS's,no masses, HSM or tenderness  EXTREMITIES: no cyanosis, clubbing or edema  Neuro: had mild nystagmus with lateral eye movement  ASSESSMENT AND PLAN:     Encounter Diagnoses   Name Primary?    Encounter for screening mammogram for breast cancer Yes    Routine health maintenance     Elevated blood pressure reading          BP recheck, in 2 weeks, bring in home readings as well, no added salt, ok to do aerobic activity,  also continue , Epley maneuvers      Meds & Refills for this Visit:  Requested Prescriptions      No prescriptions requested or ordered in this encounter       Imaging & Consults:  NERI ARLETTE 2D+3D  SCREENING BILAT (CPT=77067/89562)     The patient indicates understanding of these issues and agrees to the plan.  The patient is asked to return in 2 weeks.

## 2024-01-17 ENCOUNTER — OFFICE VISIT (OUTPATIENT)
Dept: FAMILY MEDICINE CLINIC | Facility: CLINIC | Age: 54
End: 2024-01-17
Payer: COMMERCIAL

## 2024-01-17 VITALS
SYSTOLIC BLOOD PRESSURE: 120 MMHG | WEIGHT: 156 LBS | RESPIRATION RATE: 16 BRPM | HEART RATE: 65 BPM | TEMPERATURE: 98 F | OXYGEN SATURATION: 99 % | DIASTOLIC BLOOD PRESSURE: 68 MMHG | BODY MASS INDEX: 26.63 KG/M2 | HEIGHT: 64 IN

## 2024-01-17 DIAGNOSIS — Z00.00 ROUTINE HEALTH MAINTENANCE: ICD-10-CM

## 2024-01-17 DIAGNOSIS — Z12.4 CERVICAL CANCER SCREENING: Primary | ICD-10-CM

## 2024-01-17 LAB
ALBUMIN SERPL-MCNC: 3.6 G/DL (ref 3.4–5)
ALBUMIN/GLOB SERPL: 1 {RATIO} (ref 1–2)
ALP LIVER SERPL-CCNC: 78 U/L
ALT SERPL-CCNC: 33 U/L
ANION GAP SERPL CALC-SCNC: 7 MMOL/L (ref 0–18)
AST SERPL-CCNC: 18 U/L (ref 15–37)
BASOPHILS # BLD AUTO: 0.07 X10(3) UL (ref 0–0.2)
BASOPHILS NFR BLD AUTO: 1 %
BILIRUB SERPL-MCNC: 0.4 MG/DL (ref 0.1–2)
BUN BLD-MCNC: 9 MG/DL (ref 9–23)
CALCIUM BLD-MCNC: 9.2 MG/DL (ref 8.5–10.1)
CHLORIDE SERPL-SCNC: 103 MMOL/L (ref 98–112)
CHOLEST SERPL-MCNC: 356 MG/DL (ref ?–200)
CO2 SERPL-SCNC: 28 MMOL/L (ref 21–32)
CREAT BLD-MCNC: 1.04 MG/DL
EGFRCR SERPLBLD CKD-EPI 2021: 65 ML/MIN/1.73M2 (ref 60–?)
EOSINOPHIL # BLD AUTO: 0.08 X10(3) UL (ref 0–0.7)
EOSINOPHIL NFR BLD AUTO: 1.1 %
ERYTHROCYTE [DISTWIDTH] IN BLOOD BY AUTOMATED COUNT: 12.6 %
FASTING PATIENT LIPID ANSWER: YES
FASTING STATUS PATIENT QL REPORTED: YES
GLOBULIN PLAS-MCNC: 3.7 G/DL (ref 2.8–4.4)
GLUCOSE BLD-MCNC: 80 MG/DL (ref 70–99)
HCT VFR BLD AUTO: 44.5 %
HDLC SERPL-MCNC: 70 MG/DL (ref 40–59)
HGB BLD-MCNC: 14.5 G/DL
IMM GRANULOCYTES # BLD AUTO: 0.01 X10(3) UL (ref 0–1)
IMM GRANULOCYTES NFR BLD: 0.1 %
LDLC SERPL CALC-MCNC: 269 MG/DL (ref ?–100)
LYMPHOCYTES # BLD AUTO: 2.66 X10(3) UL (ref 1–4)
LYMPHOCYTES NFR BLD AUTO: 37.6 %
MCH RBC QN AUTO: 28.7 PG (ref 26–34)
MCHC RBC AUTO-ENTMCNC: 32.6 G/DL (ref 31–37)
MCV RBC AUTO: 88.1 FL
MONOCYTES # BLD AUTO: 0.52 X10(3) UL (ref 0.1–1)
MONOCYTES NFR BLD AUTO: 7.3 %
NEUTROPHILS # BLD AUTO: 3.74 X10 (3) UL (ref 1.5–7.7)
NEUTROPHILS # BLD AUTO: 3.74 X10(3) UL (ref 1.5–7.7)
NEUTROPHILS NFR BLD AUTO: 52.9 %
NONHDLC SERPL-MCNC: 286 MG/DL (ref ?–130)
OSMOLALITY SERPL CALC.SUM OF ELEC: 284 MOSM/KG (ref 275–295)
PLATELET # BLD AUTO: 338 10(3)UL (ref 150–450)
POTASSIUM SERPL-SCNC: 3.8 MMOL/L (ref 3.5–5.1)
PROT SERPL-MCNC: 7.3 G/DL (ref 6.4–8.2)
RBC # BLD AUTO: 5.05 X10(6)UL
SODIUM SERPL-SCNC: 138 MMOL/L (ref 136–145)
T4 FREE SERPL-MCNC: 1 NG/DL (ref 0.8–1.7)
TRIGL SERPL-MCNC: 102 MG/DL (ref 30–149)
TSI SER-ACNC: 1.73 MIU/ML (ref 0.36–3.74)
VLDLC SERPL CALC-MCNC: 25 MG/DL (ref 0–30)
WBC # BLD AUTO: 7.1 X10(3) UL (ref 4–11)

## 2024-01-17 PROCEDURE — 85025 COMPLETE CBC W/AUTO DIFF WBC: CPT | Performed by: FAMILY MEDICINE

## 2024-01-17 PROCEDURE — 88175 CYTOPATH C/V AUTO FLUID REDO: CPT | Performed by: FAMILY MEDICINE

## 2024-01-17 PROCEDURE — 99396 PREV VISIT EST AGE 40-64: CPT | Performed by: FAMILY MEDICINE

## 2024-01-17 PROCEDURE — 3074F SYST BP LT 130 MM HG: CPT | Performed by: FAMILY MEDICINE

## 2024-01-17 PROCEDURE — 84439 ASSAY OF FREE THYROXINE: CPT | Performed by: FAMILY MEDICINE

## 2024-01-17 PROCEDURE — 3078F DIAST BP <80 MM HG: CPT | Performed by: FAMILY MEDICINE

## 2024-01-17 PROCEDURE — 80053 COMPREHEN METABOLIC PANEL: CPT | Performed by: FAMILY MEDICINE

## 2024-01-17 PROCEDURE — 3008F BODY MASS INDEX DOCD: CPT | Performed by: FAMILY MEDICINE

## 2024-01-17 PROCEDURE — 80061 LIPID PANEL: CPT | Performed by: FAMILY MEDICINE

## 2024-01-17 PROCEDURE — 87624 HPV HI-RISK TYP POOLED RSLT: CPT | Performed by: FAMILY MEDICINE

## 2024-01-17 PROCEDURE — 84443 ASSAY THYROID STIM HORMONE: CPT | Performed by: FAMILY MEDICINE

## 2024-01-17 NOTE — PROGRESS NOTES
HPI:   Devi Gonzalez is a 53 year old female who presents for a complete physical exam. Symptoms: denies discharge, itching, burning or dysuria. Patient complains of nothing at this time she is due for a PAP and . Her last pap was 4 years ago, never had an abnormal PAP. Takes fiber and metamucil, and not able to have normal bowel movements. Also wondering about menopausal state had a period in Nov, and then skipped December and had an abbreviated period in between. Needs to get labs done, and mammogram yet    Immunization History   Administered Date(s) Administered    FLULAVAL 6 months & older 0.5 ml Prefilled syringe ([id number]) 11/13/2019, 12/10/2021    Influenza 10/21/2009, 10/26/2011, 10/18/2020    Influenza(Afluria)0.5ml QIV PFS 10/18/2020    TDAP 04/13/2011, 12/04/2020    Zoster Vaccine Recombinant Adjuvanted (Shingrix) 01/13/2023, 08/04/2023     Wt Readings from Last 6 Encounters:   01/17/24 156 lb (70.8 kg)   01/03/24 157 lb 3.2 oz (71.3 kg)   12/20/23 150 lb 4 oz (68.2 kg)   08/30/23 165 lb 6.4 oz (75 kg)   01/13/23 152 lb 9.6 oz (69.2 kg)   12/23/22 154 lb 3.2 oz (69.9 kg)     Body mass index is 26.78 kg/m².     Lab Results   Component Value Date     (H) 12/30/2022     (H) 12/10/2021    GLU 88 12/04/2020     Lab Results   Component Value Date    CHOLEST 219 (H) 12/30/2022    CHOLEST 235 (H) 12/10/2021    CHOLEST 258 (H) 12/04/2020     Lab Results   Component Value Date    HDL 68 (H) 12/30/2022    HDL 72 (H) 12/10/2021    HDL 64 (H) 12/04/2020     Lab Results   Component Value Date     (H) 12/30/2022     (H) 12/10/2021     (H) 12/04/2020     Lab Results   Component Value Date    AST 13 (L) 12/30/2022    AST 9 (L) 12/10/2021    AST 13 (L) 12/04/2020     Lab Results   Component Value Date    ALT 22 12/30/2022    ALT 21 12/10/2021    ALT 22 12/04/2020       Current Outpatient Medications   Medication Sig Dispense Refill    buPROPion  MG Oral Tablet 24 Hr TAKE 1  TABLET DAILY. TAKE WITH THE 300MG TABLET      Magnesium 400 MG Oral Cap       acyclovir 200 MG Oral Cap TAKE ONE CAPSULE BY MOUTH FOUR TIMES DAILY WHILE LESION IS PRESENT AS DIRECTED      Zinc Sulfate (ZINC 15 OR) Take by mouth daily.      busPIRone HCl 30 MG Oral Tab TK SS T PO QAM AND ONE T PO QPM      buPROPion HCl ER, XL, 300 MG Oral Tablet 24 Hr TK 1 T PO QD  0    Omega-3-acid Ethyl Esters 1 g Oral Cap Take 1 capsule (1 g total) by mouth daily.      ALPRAZolam 0.25 MG Oral Tab TK 1 T PO  QD PRA.  1    Melatonin 10 MG Oral Cap Take 1 capsule by mouth nightly as needed.        Past Medical History:   Diagnosis Date    Anxiety     Depression     Family history of malignant neoplasm of breast 4/13/11    Iron deficiency anemia secondary to blood loss (chronic) 5/26/10    Nontoxic uninodular goiter 11/25/09    Thyroid nodule    Perforation of tympanic membrane, unspecified 8/12/09      Past Surgical History:   Procedure Laterality Date    BACK SURGERY  2003    lumbar disc    OTHER SURGICAL HISTORY      R bunionectomy    TONSILLECTOMY        Family History   Problem Relation Age of Onset    Hypertension Mother     Lipids Mother     Cancer Maternal Grandmother         breast    Breast Cancer Maternal Grandmother 53    Other (Other) Father         cataract surgery, knee replacement    Breast Cancer Paternal Aunt 74        estimate    Cancer Paternal Aunt         stage 3 cancer from lung currently, had had breast as well, HX of thyroid CA (treated)    Heart Disorder Maternal Grandfather         stroke-was in wheel chair      Social History:   Social History     Socioeconomic History    Marital status:     Number of children: 4   Occupational History    Occupation: Dental Assistant   Tobacco Use    Smoking status: Never    Smokeless tobacco: Never    Tobacco comments:     Non-smoker   Vaping Use    Vaping Use: Never used   Substance and Sexual Activity    Alcohol use: Yes     Comment: socially    Drug use: Yes      Types: Cannabis     Occ: dental hygenist. : . Children: 3.   Exercise: minimal.  Diet: watches minimally     REVIEW OF SYSTEMS:   GENERAL: feels well otherwise  SKIN: denies any unusual skin lesions  EYES:denies blurred vision or double vision  HEENT: denies nasal congestion, sinus pain or ST  LUNGS: denies shortness of breath with exertion  CARDIOVASCULAR: denies chest pain on exertion  GI: denies abdominal pain,denies heartburn  : denies dysuria, vaginal discharge or itching,periods regular   MUSCULOSKELETAL: denies back pain  NEURO: denies headaches  PSYCHE: denies depression or anxiety  HEMATOLOGIC: denies hx of anemia  ENDOCRINE: denies thyroid history  ALL/ASTHMA: denies hx of allergy or asthma    EXAM:   /68   Pulse 65   Temp 98 °F (36.7 °C) (Temporal)   Resp 16   Ht 5' 4\" (1.626 m)   Wt 156 lb (70.8 kg)   LMP 11/29/2023 (Exact Date)   SpO2 99%   BMI 26.78 kg/m²   Body mass index is 26.78 kg/m².   GENERAL: well developed, well nourished,in no apparent distress  SKIN: no rashes,no suspicious lesions  HEENT: atraumatic, normocephalic,ears and throat are clear  EYES:PERRLA, EOMI, normal optic disk,conjunctiva are clear  NECK: supple,no adenopathy,no bruits  CHEST: no chest tenderness  BREAST: no dominant or suspicious mass  LUNGS: clear to auscultation  CARDIO: RRR without murmur  GI: good BS's,no masses, HSM or tenderness  :introitus is normal,scant discharge,cervix is pink,no adnexal masses or tenderness, PAP was done   MUSCULOSKELETAL: back is not tender,FROM of the back  EXTREMITIES: no cyanosis, clubbing or edema  NEURO: Oriented times three,cranial nerves are intact,motor and sensory are grossly intact    ASSESSMENT AND PLAN:   Devi Gonzalez is a 53 year old female who presents for a complete physical exam. THINPREP Pap and pelvic done. Order put in for mammogram and dexascan. Self breast exam explained. Health maintenance, will check fasting Lipids, CMP, and CBC.  Not yet due  for screening colonoscopy. Pt info handouts given for: exercise, low fat diet and breast self-exam. Pt' s weight is Body mass index is 26.78 kg/m²., recommended low fat diet and aerobic exercise 30 minutes three times weekly.  The patient indicates understanding of these issues and agrees to the plan.  The patient is asked to return for CPX in 1 year.  Encounter Diagnoses   Name Primary?    Cervical cancer screening Yes    Routine health maintenance        Orders Placed This Encounter   Procedures    ThinPrep PAP with HPV Reflex Request       Meds & Refills for this Visit:  Requested Prescriptions      No prescriptions requested or ordered in this encounter       Imaging & Consults:  None

## 2024-01-18 ENCOUNTER — PATIENT MESSAGE (OUTPATIENT)
Dept: FAMILY MEDICINE CLINIC | Facility: CLINIC | Age: 54
End: 2024-01-18

## 2024-01-18 DIAGNOSIS — E78.2 MIXED HYPERLIPIDEMIA: Primary | ICD-10-CM

## 2024-01-18 DIAGNOSIS — E04.1 THYROID NODULE: Primary | ICD-10-CM

## 2024-01-18 NOTE — TELEPHONE ENCOUNTER
From: Devi Gonzalez  To: Julio Johnson  Sent: 1/18/2024 11:52 AM CST  Subject: Holy bad.     Dr Johnson-   I got your VM, I tried calling back between patients, but unable to talk with anyone. I’m done today around 1:30, so if you can call anytime after that.   I’m so upset, frustrated & disgusted by my cholesterol levels!! Remember yesterday when I said I know I needed to get better w/my workouts? Well- my test results are the ass kick that I needed.   My mother has high cholesterol & she is pretty good w/her diet & regularly works out, I’m not using that as any form of excuse- but just a piece of my health history puzzle.     How much time will you give me to try & improve these numbers on my own, or are my levels too high for that at this point?     I’ll wait to hear from you & gosh I hope there’s nothing else bad that you needed to talk with me about!! Thanks- Devi.

## 2024-01-22 ENCOUNTER — TELEPHONE (OUTPATIENT)
Dept: FAMILY MEDICINE CLINIC | Facility: CLINIC | Age: 54
End: 2024-01-22

## 2024-01-22 LAB
.: NORMAL
.: NORMAL
HPV I/H RISK 1 DNA SPEC QL NAA+PROBE: NEGATIVE

## 2024-01-22 NOTE — TELEPHONE ENCOUNTER
----- Message from Julio Johnson DO sent at 1/22/2024  4:06 PM CST -----  Please notify Serenity her PAP was negative, the pathology was negative and her HPV was negative. She's good for 3 years

## 2024-02-27 ENCOUNTER — HOSPITAL ENCOUNTER (OUTPATIENT)
Dept: MAMMOGRAPHY | Age: 54
Discharge: HOME | End: 2024-02-27
Attending: FAMILY MEDICINE
Payer: COMMERCIAL

## 2024-02-27 ENCOUNTER — HOSPITAL ENCOUNTER (OUTPATIENT)
Dept: ULTRASOUND IMAGING | Age: 54
Discharge: HOME | End: 2024-02-27
Attending: FAMILY MEDICINE
Payer: COMMERCIAL

## 2024-02-27 DIAGNOSIS — Z12.31 ENCOUNTER FOR SCREENING MAMMOGRAM FOR BREAST CANCER: ICD-10-CM

## 2024-02-27 DIAGNOSIS — Z00.00 ROUTINE HEALTH MAINTENANCE: ICD-10-CM

## 2024-02-27 DIAGNOSIS — E04.1 THYROID NODULE: ICD-10-CM

## 2024-02-27 PROCEDURE — 76536 US EXAM OF HEAD AND NECK: CPT | Performed by: FAMILY MEDICINE

## 2024-02-27 PROCEDURE — 77063 BREAST TOMOSYNTHESIS BI: CPT | Performed by: FAMILY MEDICINE

## 2024-02-27 PROCEDURE — 77067 SCR MAMMO BI INCL CAD: CPT | Performed by: FAMILY MEDICINE

## 2024-02-28 ENCOUNTER — PATIENT MESSAGE (OUTPATIENT)
Dept: FAMILY MEDICINE CLINIC | Facility: CLINIC | Age: 54
End: 2024-02-28

## 2024-02-28 DIAGNOSIS — E04.2 MULTIPLE THYROID NODULES: Primary | ICD-10-CM

## 2024-02-28 NOTE — TELEPHONE ENCOUNTER
From: Devi Gonzalez  To: Julio Johnson  Sent: 2/28/2024 9:17 AM CST  Subject: Further imaging     Good morning Dr Johnson-     I read yesterday’s mammogram results & see that they did mention alternative images due to breast density- which I recvd the same message last year & didn’t do it. Would you please order further imaging- either the MRI or MBI, whichever you feel is most accurate/diagnostic. You may realize by now- I prefer to be proactive in my healthcare- rather than reactive.     Thank you very much.

## 2025-01-24 DIAGNOSIS — E04.2 MULTIPLE THYROID NODULES: ICD-10-CM

## 2025-01-24 DIAGNOSIS — Z00.00 ROUTINE HEALTH MAINTENANCE: ICD-10-CM

## 2025-01-24 DIAGNOSIS — Z12.31 ENCOUNTER FOR SCREENING MAMMOGRAM FOR BREAST CANCER: Primary | ICD-10-CM

## 2025-01-25 ENCOUNTER — LAB ENCOUNTER (OUTPATIENT)
Dept: LAB | Age: 55
End: 2025-01-25
Attending: FAMILY MEDICINE
Payer: COMMERCIAL

## 2025-01-25 DIAGNOSIS — Z00.00 ROUTINE HEALTH MAINTENANCE: ICD-10-CM

## 2025-01-25 LAB
ALBUMIN SERPL-MCNC: 4.5 G/DL (ref 3.2–4.8)
ALBUMIN/GLOB SERPL: 1.8 {RATIO} (ref 1–2)
ALP LIVER SERPL-CCNC: 83 U/L
ALT SERPL-CCNC: 12 U/L
ANION GAP SERPL CALC-SCNC: 5 MMOL/L (ref 0–18)
AST SERPL-CCNC: 13 U/L (ref ?–34)
BASOPHILS # BLD AUTO: 0.06 X10(3) UL (ref 0–0.2)
BASOPHILS NFR BLD AUTO: 0.9 %
BILIRUB SERPL-MCNC: 0.2 MG/DL (ref 0.3–1.2)
BUN BLD-MCNC: 14 MG/DL (ref 9–23)
CALCIUM BLD-MCNC: 9.7 MG/DL (ref 8.7–10.6)
CHLORIDE SERPL-SCNC: 106 MMOL/L (ref 98–112)
CHOLEST SERPL-MCNC: 257 MG/DL (ref ?–200)
CO2 SERPL-SCNC: 29 MMOL/L (ref 21–32)
CREAT BLD-MCNC: 1.06 MG/DL
EGFRCR SERPLBLD CKD-EPI 2021: 63 ML/MIN/1.73M2 (ref 60–?)
EOSINOPHIL # BLD AUTO: 0.11 X10(3) UL (ref 0–0.7)
EOSINOPHIL NFR BLD AUTO: 1.7 %
ERYTHROCYTE [DISTWIDTH] IN BLOOD BY AUTOMATED COUNT: 13.9 %
FASTING PATIENT LIPID ANSWER: YES
FASTING STATUS PATIENT QL REPORTED: YES
GLOBULIN PLAS-MCNC: 2.5 G/DL (ref 2–3.5)
GLUCOSE BLD-MCNC: 95 MG/DL (ref 70–99)
HCT VFR BLD AUTO: 42.5 %
HDLC SERPL-MCNC: 55 MG/DL (ref 40–59)
HGB BLD-MCNC: 13.8 G/DL
IMM GRANULOCYTES # BLD AUTO: 0.02 X10(3) UL (ref 0–1)
IMM GRANULOCYTES NFR BLD: 0.3 %
LDLC SERPL CALC-MCNC: 183 MG/DL (ref ?–100)
LYMPHOCYTES # BLD AUTO: 2.25 X10(3) UL (ref 1–4)
LYMPHOCYTES NFR BLD AUTO: 34.8 %
MCH RBC QN AUTO: 29 PG (ref 26–34)
MCHC RBC AUTO-ENTMCNC: 32.5 G/DL (ref 31–37)
MCV RBC AUTO: 89.3 FL
MONOCYTES # BLD AUTO: 0.54 X10(3) UL (ref 0.1–1)
MONOCYTES NFR BLD AUTO: 8.4 %
NEUTROPHILS # BLD AUTO: 3.48 X10 (3) UL (ref 1.5–7.7)
NEUTROPHILS # BLD AUTO: 3.48 X10(3) UL (ref 1.5–7.7)
NEUTROPHILS NFR BLD AUTO: 53.9 %
NONHDLC SERPL-MCNC: 202 MG/DL (ref ?–130)
OSMOLALITY SERPL CALC.SUM OF ELEC: 290 MOSM/KG (ref 275–295)
PLATELET # BLD AUTO: 305 10(3)UL (ref 150–450)
POTASSIUM SERPL-SCNC: 3.9 MMOL/L (ref 3.5–5.1)
PROT SERPL-MCNC: 7 G/DL (ref 5.7–8.2)
RBC # BLD AUTO: 4.76 X10(6)UL
SODIUM SERPL-SCNC: 140 MMOL/L (ref 136–145)
T4 FREE SERPL-MCNC: 1.2 NG/DL (ref 0.8–1.7)
TRIGL SERPL-MCNC: 107 MG/DL (ref 30–149)
TSI SER-ACNC: 1.79 UIU/ML (ref 0.55–4.78)
VLDLC SERPL CALC-MCNC: 22 MG/DL (ref 0–30)
WBC # BLD AUTO: 6.5 X10(3) UL (ref 4–11)

## 2025-01-25 PROCEDURE — 80061 LIPID PANEL: CPT | Performed by: FAMILY MEDICINE

## 2025-01-25 PROCEDURE — 84439 ASSAY OF FREE THYROXINE: CPT | Performed by: FAMILY MEDICINE

## 2025-01-25 PROCEDURE — 80050 GENERAL HEALTH PANEL: CPT | Performed by: FAMILY MEDICINE

## 2025-01-29 ENCOUNTER — OFFICE VISIT (OUTPATIENT)
Dept: FAMILY MEDICINE CLINIC | Facility: CLINIC | Age: 55
End: 2025-01-29
Payer: COMMERCIAL

## 2025-01-29 VITALS
BODY MASS INDEX: 27.15 KG/M2 | RESPIRATION RATE: 16 BRPM | HEART RATE: 80 BPM | OXYGEN SATURATION: 98 % | TEMPERATURE: 98 F | WEIGHT: 153.25 LBS | SYSTOLIC BLOOD PRESSURE: 120 MMHG | DIASTOLIC BLOOD PRESSURE: 82 MMHG | HEIGHT: 63 IN

## 2025-01-29 DIAGNOSIS — Z00.00 ROUTINE HEALTH MAINTENANCE: Primary | ICD-10-CM

## 2025-01-29 RX ORDER — LUTEIN 6 MG
TABLET ORAL
COMMUNITY
Start: 2025-01-29

## 2025-01-29 NOTE — PROGRESS NOTES
HPI:   Devi Gonzalez is a 54 year old female who presents for a complete physical exam. Symptoms: denies discharge, itching, burning or dysuria. Patient complains of having issues. With her weight, constipation, and generalized fatigue, has difficulty sleeping restless at times. She does not eat well, not enough fiber or water  not enough time to eat or drink, takes colace for constipation. Has no time to exercise      Immunization History   Administered Date(s) Administered    FLULAVAL 6 months & older 0.5 ml Prefilled syringe (01153) 11/13/2019, 12/10/2021    Influenza 10/21/2009, 10/26/2011, 10/18/2020    Influenza Vaccine, trivalent (IIV3), PF 0.5mL ([id number]) 01/29/2025    Influenza(Afluria)0.5ml QIV PFS 10/18/2020    TDAP 04/13/2011, 12/04/2020    Zoster Vaccine Recombinant Adjuvanted (Shingrix) 01/13/2023, 08/04/2023     Wt Readings from Last 6 Encounters:   01/29/25 153 lb 4 oz (69.5 kg)   01/17/24 156 lb (70.8 kg)   01/03/24 157 lb 3.2 oz (71.3 kg)   12/20/23 150 lb 4 oz (68.2 kg)   08/30/23 165 lb 6.4 oz (75 kg)   01/13/23 152 lb 9.6 oz (69.2 kg)     Body mass index is 27.15 kg/m².     Lab Results   Component Value Date    GLU 95 01/25/2025    GLU 80 01/17/2024     (H) 12/30/2022     Lab Results   Component Value Date    CHOLEST 257 (H) 01/25/2025    CHOLEST 356 (H) 01/17/2024    CHOLEST 219 (H) 12/30/2022     Lab Results   Component Value Date    HDL 55 01/25/2025    HDL 70 (H) 01/17/2024    HDL 68 (H) 12/30/2022     Lab Results   Component Value Date     (H) 01/25/2025     (H) 01/17/2024     (H) 12/30/2022     Lab Results   Component Value Date    AST 13 01/25/2025    AST 18 01/17/2024    AST 13 (L) 12/30/2022     Lab Results   Component Value Date    ALT 12 01/25/2025    ALT 33 01/17/2024    ALT 22 12/30/2022       Current Outpatient Medications   Medication Sig Dispense Refill    Lutein 20 MG Oral Tab Daily for MD      buPROPion  MG Oral Tablet 24 Hr TAKE 1 TABLET  DAILY. TAKE WITH THE 300MG TABLET      Magnesium 400 MG Oral Cap       acyclovir 200 MG Oral Cap TAKE ONE CAPSULE BY MOUTH FOUR TIMES DAILY WHILE LESION IS PRESENT AS DIRECTED      Zinc Sulfate (ZINC 15 OR) Take by mouth daily.      busPIRone HCl 30 MG Oral Tab TK SS T PO QAM AND ONE T PO QPM      buPROPion HCl ER, XL, 300 MG Oral Tablet 24 Hr TK 1 T PO QD  0    Omega-3-acid Ethyl Esters 1 g Oral Cap Take 1 capsule (1 g total) by mouth daily.      ALPRAZolam 0.25 MG Oral Tab TK 1 T PO  QD PRA.  1    Melatonin 10 MG Oral Cap Take 1 capsule by mouth nightly as needed.        Past Medical History:    Anxiety    Depression    Family history of malignant neoplasm of breast    Iron deficiency anemia secondary to blood loss (chronic)    Nontoxic uninodular goiter    Thyroid nodule    Perforation of tympanic membrane, unspecified      Past Surgical History:   Procedure Laterality Date    Back surgery  2003    lumbar disc    Other surgical history      R bunionectomy    Tonsillectomy        Family History   Problem Relation Age of Onset    Hypertension Mother     Lipids Mother     Cancer Maternal Grandmother         breast    Breast Cancer Maternal Grandmother 53    Other (Other) Father         cataract surgery, knee replacement    Breast Cancer Paternal Aunt 74        estimate    Cancer Paternal Aunt         stage 3 cancer from lung currently, had had breast as well, HX of thyroid CA (treated)    Heart Disorder Maternal Grandfather         stroke-was in wheel chair      Social History:   Social History     Socioeconomic History    Marital status:     Number of children: 4   Occupational History    Occupation: Dental Assistant   Tobacco Use    Smoking status: Never    Smokeless tobacco: Never    Tobacco comments:     Non-smoker   Vaping Use    Vaping status: Never Used   Substance and Sexual Activity    Alcohol use: Yes     Comment: socially    Drug use: Yes     Types: Cannabis     Occ: teacher. : .  Children: 2.   Exercise:  none  times per week.  Diet: watches calories closely     REVIEW OF SYSTEMS:   GENERAL: feels tired all the time  SKIN: denies any unusual skin lesions  EYES:denies blurred vision or double vision  HEENT: denies nasal congestion, sinus pain or ST  LUNGS: denies shortness of breath with exertion  CARDIOVASCULAR: denies chest pain on exertion  GI: denies abdominal pain,denies heartburn  : denies dysuria, vaginal discharge or itching,periods regular   MUSCULOSKELETAL: denies back pain  NEURO: denies headaches  PSYCHE: denies depression or anxiety  HEMATOLOGIC: denies hx of anemia  ENDOCRINE: denies thyroid history  ALL/ASTHMA: denies hx of allergy or asthma    EXAM:   /82   Pulse 80   Temp 97.9 °F (36.6 °C) (Temporal)   Resp 16   Ht 5' 3\" (1.6 m)   Wt 153 lb 4 oz (69.5 kg)   LMP 01/31/2024 (Exact Date)   SpO2 98%   BMI 27.15 kg/m²   Body mass index is 27.15 kg/m².   GENERAL: well developed, well nourished,in no apparent distress  SKIN: no rashes,no suspicious lesions  HEENT: atraumatic, normocephalic,ears and throat are clear  EYES:PERRLA, EOMI, normal optic disk,conjunctiva are clear  NECK: supple,no adenopathy,no bruits  CHEST: no chest tenderness  LUNGS: clear to auscultation  CARDIO: RRR without murmur  GI: good BS's,no masses, HSM or tenderness  MUSCULOSKELETAL: back is not tender,FROM of the back  EXTREMITIES: no cyanosis, clubbing or edema  NEURO: Oriented times three,cranial nerves are intact,motor and sensory are grossly intact    ASSESSMENT AND PLAN:   Devi Gonzalez is a 54 year old female who presents for a complete physical exam. NO Pap and pelvic done. Order put in for mammogram and dexascan. Self breast exam explained. Health maintenance, reviewed  fasting Lipids, CMP, and COMPLETE BLOOD COUNT, TSH. Pt info handouts given for: exercise, low fat diet and breast self-exam. Pt' s weight is Body mass index is 27.15 kg/m²., recommended low fat diet and aerobic  exercise 30 minutes three times weekly.  The patient indicates understanding of these issues and agrees to the plan.  The patient is asked to return for CPX in 1 year.  INSTRUCTED TO TRY AND GET 5 SERVINGS OF FRUITS AND VEGETABLES DAILY  4 SERVINGS OF WATER  3 SERVINGS OF LOW FAT DAIRY DAILY  AND 60 MINUTES OF PHYSICAL ACTIVITY A DAY

## 2025-01-30 ENCOUNTER — ORDER TRANSCRIPTION (OUTPATIENT)
Dept: ADMINISTRATIVE | Facility: HOSPITAL | Age: 55
End: 2025-01-30

## 2025-01-30 DIAGNOSIS — Z13.6 SCREENING FOR CARDIOVASCULAR CONDITION: Primary | ICD-10-CM

## 2025-02-04 ENCOUNTER — HOSPITAL ENCOUNTER (OUTPATIENT)
Dept: CT IMAGING | Age: 55
Discharge: HOME | End: 2025-02-04
Attending: FAMILY MEDICINE

## 2025-02-04 DIAGNOSIS — Z13.6 SCREENING FOR CARDIOVASCULAR CONDITION: ICD-10-CM

## 2025-02-06 ENCOUNTER — TELEPHONE (OUTPATIENT)
Dept: FAMILY MEDICINE CLINIC | Facility: CLINIC | Age: 55
End: 2025-02-06

## 2025-02-06 NOTE — TELEPHONE ENCOUNTER
----- Message from Juloi Johnson sent at 2/6/2025  2:59 PM CST -----  Devi, I'm happy to tell you  your heart scan score came back a perfect zero, so just watch your diet as much as you can, get some aerobic  activity 3-5 days a week and we can recheck this in 3-5 years

## 2025-02-26 ENCOUNTER — TELEPHONE (OUTPATIENT)
Dept: FAMILY MEDICINE CLINIC | Facility: CLINIC | Age: 55
End: 2025-02-26

## 2025-02-26 NOTE — TELEPHONE ENCOUNTER
Letter mailed to patient reminding them they have outstanding orders.  Lab Frequency Next Occurrence   [facility] ARLETTE 2D+3D SCREENING BILAT (CPT=77067/85839) Once 01/24/2025   US THYROID (CPT=76536) Once 01/24/2025

## 2025-02-28 ENCOUNTER — HOSPITAL ENCOUNTER (OUTPATIENT)
Dept: MAMMOGRAPHY | Age: 55
Discharge: HOME | End: 2025-02-28
Attending: FAMILY MEDICINE
Payer: COMMERCIAL

## 2025-02-28 ENCOUNTER — HOSPITAL ENCOUNTER (OUTPATIENT)
Dept: ULTRASOUND IMAGING | Age: 55
Discharge: HOME | End: 2025-02-28
Attending: FAMILY MEDICINE
Payer: COMMERCIAL

## 2025-02-28 DIAGNOSIS — Z12.31 ENCOUNTER FOR SCREENING MAMMOGRAM FOR BREAST CANCER: ICD-10-CM

## 2025-02-28 DIAGNOSIS — Z00.00 ROUTINE HEALTH MAINTENANCE: ICD-10-CM

## 2025-02-28 DIAGNOSIS — E04.2 MULTIPLE THYROID NODULES: ICD-10-CM

## 2025-02-28 PROCEDURE — 77067 SCR MAMMO BI INCL CAD: CPT | Performed by: FAMILY MEDICINE

## 2025-02-28 PROCEDURE — 77063 BREAST TOMOSYNTHESIS BI: CPT | Performed by: FAMILY MEDICINE

## 2025-02-28 PROCEDURE — 76536 US EXAM OF HEAD AND NECK: CPT | Performed by: FAMILY MEDICINE

## 2025-03-20 ENCOUNTER — HOSPITAL ENCOUNTER (OUTPATIENT)
Dept: MAMMOGRAPHY | Facility: HOSPITAL | Age: 55
Discharge: HOME | End: 2025-03-20
Attending: FAMILY MEDICINE
Payer: COMMERCIAL

## 2025-03-20 DIAGNOSIS — R92.2 INCONCLUSIVE MAMMOGRAM: ICD-10-CM

## 2025-03-20 PROCEDURE — 77065 DX MAMMO INCL CAD UNI: CPT | Performed by: FAMILY MEDICINE

## 2025-03-20 PROCEDURE — 77061 BREAST TOMOSYNTHESIS UNI: CPT | Performed by: FAMILY MEDICINE

## 2025-03-20 PROCEDURE — 76642 ULTRASOUND BREAST LIMITED: CPT | Performed by: FAMILY MEDICINE

## (undated) DIAGNOSIS — D50.0 IRON DEFICIENCY ANEMIA SECONDARY TO BLOOD LOSS (CHRONIC): ICD-10-CM

## (undated) NOTE — MR AVS SNAPSHOT
[address] [facility] [address]  [phone]               Thank you for choosing us for your health care visit with Parish Ventura MD.  We are glad to serve you and happy to provide you with this sum Assoc Dx:  Routine history and physical examination of adult [Z00.00], Screening, anemia, deficiency, iron [Z13.0]           Comp Metabolic Panel (14) [E]    Complete by:  Feb 15, 2017 (Approximate)    Assoc Dx:  Routine history and physical examination o Call [phone] for help. [employer]hart is NOT to be used for urgent needs. For medical emergencies, dial 911.         Educational Information     Healthy Diet and Regular Exercise  [email or web address] for making healthy food choices  -

## (undated) NOTE — MR AVS SNAPSHOT
After Visit Summary   2/15/2017    Teja Campos    MRN: [id number]           Visit Information        Provider Department Dept Phone    2/15/2017  1:20 PM MD Joann Sharma [phone]      Your Vitals Were     BP Pulse Temp(Src VITAMIN D, 25-HYDROXY [3822426 CUSTOM]     Future Labs/Procedures Expected by Expires    CBC W/ DIFFERENTIAL [[id number] CUSTOM]  2/15/2017 (Approximate) 2/15/2018    CBC WITH DIFFERENTIAL WITH PLATELET [[id number] CUSTOM]  2/15/2017 (Approximate) 2/15/2018

## (undated) NOTE — LETTER
[address] [id number]    5/29/2018      Dear  Joselin Bardales    In order to provide the highest quality care, [facility] uses a sophisticated computer system to track our patien

## (undated) NOTE — LETTER
12/16/19  [place] [address]      Dear Az Otto. To help us provide the highest quality medical care, [facility] uses a sophisticated computer system to track our patient records.  Pato Phillips

## (undated) NOTE — LETTER
Devi Gonzalez   [address] [facility] [address]           Dear Devi Gonzalez     Our records indicate that you have outstanding lab work and or testing that was ordered for you and has not yet been completed:  Lab Frequency Next Occurrence   [facility] ARLETTE 2D+3D SCREENING BILAT (CPT=77067/08703) Once 01/24/2025   US THYROID (CPT=76536) Once 01/24/2025      To provide you with the best possible care, please complete these orders at your earliest convenience. If you have recently completed these orders please disregard this letter.     If you have any questions please call the office at [phone].     Thank you,     [place]

## (undated) NOTE — LETTER
[address] [address] [address]    3/18/2022      Dear  Marlyce Bamberger    In order to provide the highest quality care, [facility] uses a sophisticated computer system to track our patient's records.   You are due for   Lab Frequency Next Occurrence   HEMOGLOBIN Once 12/22/2021         Please call the office at your earliest convenience to schedule your appointment or test.          Sincerely,        The office of [facility]  [phone]